# Patient Record
Sex: MALE | Employment: UNEMPLOYED | URBAN - METROPOLITAN AREA
[De-identification: names, ages, dates, MRNs, and addresses within clinical notes are randomized per-mention and may not be internally consistent; named-entity substitution may affect disease eponyms.]

---

## 2024-05-12 ENCOUNTER — HOSPITAL ENCOUNTER (INPATIENT)
Age: 59
LOS: 4 days | Discharge: OTHER INSTITUTION | DRG: 372 | End: 2024-05-16
Attending: STUDENT IN AN ORGANIZED HEALTH CARE EDUCATION/TRAINING PROGRAM | Admitting: INTERNAL MEDICINE
Payer: COMMERCIAL

## 2024-05-12 ENCOUNTER — APPOINTMENT (OUTPATIENT)
Dept: CT IMAGING | Age: 59
DRG: 372 | End: 2024-05-12

## 2024-05-12 ENCOUNTER — HOSPITAL ENCOUNTER (EMERGENCY)
Age: 59
Discharge: HOME | End: 2024-05-12
Attending: EMERGENCY MEDICINE

## 2024-05-12 VITALS
HEIGHT: 72 IN | BODY MASS INDEX: 27.09 KG/M2 | DIASTOLIC BLOOD PRESSURE: 94 MMHG | HEART RATE: 85 BPM | RESPIRATION RATE: 18 BRPM | SYSTOLIC BLOOD PRESSURE: 123 MMHG | WEIGHT: 200 LBS | TEMPERATURE: 98.5 F | OXYGEN SATURATION: 97 %

## 2024-05-12 DIAGNOSIS — K52.9 COLITIS: Primary | ICD-10-CM

## 2024-05-12 LAB
ALBUMIN SERPL-MCNC: 3.5 GM/DL (ref 3.4–5)
ALP BLD-CCNC: 71 IU/L (ref 40–128)
ALT SERPL-CCNC: 12 U/L (ref 10–40)
ANION GAP SERPL CALCULATED.3IONS-SCNC: 11 MMOL/L (ref 7–16)
AST SERPL-CCNC: 13 IU/L (ref 15–37)
BASOPHILS ABSOLUTE: 0 K/CU MM
BASOPHILS RELATIVE PERCENT: 0.3 % (ref 0–1)
BILIRUB SERPL-MCNC: 0.6 MG/DL (ref 0–1)
BUN SERPL-MCNC: 7 MG/DL (ref 6–23)
CALCIUM SERPL-MCNC: 8.2 MG/DL (ref 8.3–10.6)
CHLORIDE BLD-SCNC: 101 MMOL/L (ref 99–110)
CO2: 22 MMOL/L (ref 21–32)
CREAT SERPL-MCNC: 0.6 MG/DL (ref 0.9–1.3)
DIFFERENTIAL TYPE: ABNORMAL
EOSINOPHILS ABSOLUTE: 0.1 K/CU MM
EOSINOPHILS RELATIVE PERCENT: 0.5 % (ref 0–3)
GFR, ESTIMATED: >90 ML/MIN/1.73M2
GLUCOSE SERPL-MCNC: 149 MG/DL (ref 70–99)
HCT VFR BLD CALC: 47.1 % (ref 42–52)
HEMOGLOBIN: 15.8 GM/DL (ref 13.5–18)
IMMATURE NEUTROPHIL %: 0.5 % (ref 0–0.43)
LACTATE: 1.3 MMOL/L (ref 0.5–1.9)
LIPASE: 16 IU/L (ref 13–60)
LYMPHOCYTES ABSOLUTE: 0.8 K/CU MM
LYMPHOCYTES RELATIVE PERCENT: 6.1 % (ref 24–44)
MCH RBC QN AUTO: 32 PG (ref 27–31)
MCHC RBC AUTO-ENTMCNC: 33.5 % (ref 32–36)
MCV RBC AUTO: 95.3 FL (ref 78–100)
MONOCYTES ABSOLUTE: 1.5 K/CU MM
MONOCYTES RELATIVE PERCENT: 10.9 % (ref 0–4)
NEUTROPHILS ABSOLUTE: 10.8 K/CU MM
NEUTROPHILS RELATIVE PERCENT: 81.7 % (ref 36–66)
NUCLEATED RBC %: 0 %
PDW BLD-RTO: 12.8 % (ref 11.7–14.9)
PLATELET # BLD: 196 K/CU MM (ref 140–440)
PMV BLD AUTO: 9.6 FL (ref 7.5–11.1)
POTASSIUM SERPL-SCNC: 3.7 MMOL/L (ref 3.5–5.1)
RBC # BLD: 4.94 M/CU MM (ref 4.6–6.2)
SODIUM BLD-SCNC: 134 MMOL/L (ref 135–145)
TOTAL IMMATURE NEUTOROPHIL: 0.06 K/CU MM
TOTAL NUCLEATED RBC: 0 K/CU MM
TOTAL PROTEIN: 6.4 GM/DL (ref 6.4–8.2)
WBC # BLD: 13.3 K/CU MM (ref 4–10.5)

## 2024-05-12 PROCEDURE — 2580000003 HC RX 258: Performed by: STUDENT IN AN ORGANIZED HEALTH CARE EDUCATION/TRAINING PROGRAM

## 2024-05-12 PROCEDURE — 83605 ASSAY OF LACTIC ACID: CPT

## 2024-05-12 PROCEDURE — 83690 ASSAY OF LIPASE: CPT

## 2024-05-12 PROCEDURE — 85025 COMPLETE CBC W/AUTO DIFF WBC: CPT

## 2024-05-12 PROCEDURE — 6360000002 HC RX W HCPCS: Performed by: STUDENT IN AN ORGANIZED HEALTH CARE EDUCATION/TRAINING PROGRAM

## 2024-05-12 PROCEDURE — 6370000000 HC RX 637 (ALT 250 FOR IP): Performed by: EMERGENCY MEDICINE

## 2024-05-12 PROCEDURE — 6370000000 HC RX 637 (ALT 250 FOR IP): Performed by: STUDENT IN AN ORGANIZED HEALTH CARE EDUCATION/TRAINING PROGRAM

## 2024-05-12 PROCEDURE — 81003 URINALYSIS AUTO W/O SCOPE: CPT

## 2024-05-12 PROCEDURE — 80053 COMPREHEN METABOLIC PANEL: CPT

## 2024-05-12 PROCEDURE — 96375 TX/PRO/DX INJ NEW DRUG ADDON: CPT

## 2024-05-12 PROCEDURE — 6360000002 HC RX W HCPCS: Performed by: EMERGENCY MEDICINE

## 2024-05-12 PROCEDURE — 6360000002 HC RX W HCPCS: Performed by: INTERNAL MEDICINE

## 2024-05-12 PROCEDURE — 1200000000 HC SEMI PRIVATE

## 2024-05-12 PROCEDURE — 99284 EMERGENCY DEPT VISIT MOD MDM: CPT

## 2024-05-12 PROCEDURE — 99285 EMERGENCY DEPT VISIT HI MDM: CPT

## 2024-05-12 PROCEDURE — 74177 CT ABD & PELVIS W/CONTRAST: CPT

## 2024-05-12 PROCEDURE — 87324 CLOSTRIDIUM AG IA: CPT

## 2024-05-12 PROCEDURE — 2500000003 HC RX 250 WO HCPCS: Performed by: STUDENT IN AN ORGANIZED HEALTH CARE EDUCATION/TRAINING PROGRAM

## 2024-05-12 PROCEDURE — 96374 THER/PROPH/DIAG INJ IV PUSH: CPT

## 2024-05-12 PROCEDURE — 87507 IADNA-DNA/RNA PROBE TQ 12-25: CPT

## 2024-05-12 PROCEDURE — 87449 NOS EACH ORGANISM AG IA: CPT

## 2024-05-12 PROCEDURE — 6360000004 HC RX CONTRAST MEDICATION: Performed by: STUDENT IN AN ORGANIZED HEALTH CARE EDUCATION/TRAINING PROGRAM

## 2024-05-12 PROCEDURE — 96372 THER/PROPH/DIAG INJ SC/IM: CPT

## 2024-05-12 PROCEDURE — 6370000000 HC RX 637 (ALT 250 FOR IP): Performed by: INTERNAL MEDICINE

## 2024-05-12 PROCEDURE — 2580000003 HC RX 258: Performed by: INTERNAL MEDICINE

## 2024-05-12 RX ORDER — POTASSIUM CHLORIDE 20 MEQ/1
40 TABLET, EXTENDED RELEASE ORAL PRN
Status: DISCONTINUED | OUTPATIENT
Start: 2024-05-12 | End: 2024-05-16 | Stop reason: HOSPADM

## 2024-05-12 RX ORDER — ESCITALOPRAM OXALATE 10 MG/1
5 TABLET ORAL DAILY
Status: DISCONTINUED | OUTPATIENT
Start: 2024-05-12 | End: 2024-05-16 | Stop reason: HOSPADM

## 2024-05-12 RX ORDER — 0.9 % SODIUM CHLORIDE 0.9 %
1000 INTRAVENOUS SOLUTION INTRAVENOUS ONCE
Status: COMPLETED | OUTPATIENT
Start: 2024-05-12 | End: 2024-05-12

## 2024-05-12 RX ORDER — SODIUM CHLORIDE, SODIUM LACTATE, POTASSIUM CHLORIDE, CALCIUM CHLORIDE 600; 310; 30; 20 MG/100ML; MG/100ML; MG/100ML; MG/100ML
INJECTION, SOLUTION INTRAVENOUS CONTINUOUS
Status: DISPENSED | OUTPATIENT
Start: 2024-05-12 | End: 2024-05-13

## 2024-05-12 RX ORDER — ACETAMINOPHEN 325 MG/1
650 TABLET ORAL EVERY 6 HOURS PRN
Status: DISCONTINUED | OUTPATIENT
Start: 2024-05-12 | End: 2024-05-16 | Stop reason: HOSPADM

## 2024-05-12 RX ORDER — CIPROFLOXACIN 500 MG/1
500 TABLET, FILM COATED ORAL ONCE
Status: COMPLETED | OUTPATIENT
Start: 2024-05-12 | End: 2024-05-12

## 2024-05-12 RX ORDER — SODIUM CHLORIDE 9 MG/ML
INJECTION, SOLUTION INTRAVENOUS PRN
Status: DISCONTINUED | OUTPATIENT
Start: 2024-05-12 | End: 2024-05-16 | Stop reason: HOSPADM

## 2024-05-12 RX ORDER — ACETAMINOPHEN 650 MG/1
650 SUPPOSITORY RECTAL EVERY 6 HOURS PRN
Status: DISCONTINUED | OUTPATIENT
Start: 2024-05-12 | End: 2024-05-16 | Stop reason: HOSPADM

## 2024-05-12 RX ORDER — SODIUM CHLORIDE 0.9 % (FLUSH) 0.9 %
5-40 SYRINGE (ML) INJECTION PRN
Status: DISCONTINUED | OUTPATIENT
Start: 2024-05-12 | End: 2024-05-16 | Stop reason: HOSPADM

## 2024-05-12 RX ORDER — DICYCLOMINE HCL 20 MG
20 TABLET ORAL ONCE
Status: COMPLETED | OUTPATIENT
Start: 2024-05-12 | End: 2024-05-12

## 2024-05-12 RX ORDER — POLYETHYLENE GLYCOL 3350 17 G/17G
17 POWDER, FOR SOLUTION ORAL DAILY PRN
Status: DISCONTINUED | OUTPATIENT
Start: 2024-05-12 | End: 2024-05-16 | Stop reason: HOSPADM

## 2024-05-12 RX ORDER — MAGNESIUM SULFATE IN WATER 40 MG/ML
2000 INJECTION, SOLUTION INTRAVENOUS PRN
Status: DISCONTINUED | OUTPATIENT
Start: 2024-05-12 | End: 2024-05-16 | Stop reason: HOSPADM

## 2024-05-12 RX ORDER — DICYCLOMINE HCL 20 MG
20 TABLET ORAL 4 TIMES DAILY PRN
Status: DISCONTINUED | OUTPATIENT
Start: 2024-05-12 | End: 2024-05-16 | Stop reason: HOSPADM

## 2024-05-12 RX ORDER — FENTANYL CITRATE 50 UG/ML
100 INJECTION, SOLUTION INTRAMUSCULAR; INTRAVENOUS ONCE
Status: COMPLETED | OUTPATIENT
Start: 2024-05-12 | End: 2024-05-12

## 2024-05-12 RX ORDER — POTASSIUM CHLORIDE 7.45 MG/ML
10 INJECTION INTRAVENOUS PRN
Status: DISCONTINUED | OUTPATIENT
Start: 2024-05-12 | End: 2024-05-16 | Stop reason: HOSPADM

## 2024-05-12 RX ORDER — METRONIDAZOLE 500 MG/100ML
500 INJECTION, SOLUTION INTRAVENOUS EVERY 8 HOURS
Status: DISCONTINUED | OUTPATIENT
Start: 2024-05-12 | End: 2024-05-13

## 2024-05-12 RX ORDER — METOCLOPRAMIDE HYDROCHLORIDE 5 MG/ML
10 INJECTION INTRAMUSCULAR; INTRAVENOUS EVERY 6 HOURS
Status: DISCONTINUED | OUTPATIENT
Start: 2024-05-12 | End: 2024-05-16 | Stop reason: HOSPADM

## 2024-05-12 RX ORDER — TAMSULOSIN HYDROCHLORIDE 0.4 MG/1
0.4 CAPSULE ORAL DAILY
Status: DISCONTINUED | OUTPATIENT
Start: 2024-05-12 | End: 2024-05-16 | Stop reason: HOSPADM

## 2024-05-12 RX ORDER — SODIUM CHLORIDE 0.9 % (FLUSH) 0.9 %
5-40 SYRINGE (ML) INJECTION EVERY 12 HOURS SCHEDULED
Status: DISCONTINUED | OUTPATIENT
Start: 2024-05-12 | End: 2024-05-16 | Stop reason: HOSPADM

## 2024-05-12 RX ORDER — METRONIDAZOLE 500 MG/100ML
500 INJECTION, SOLUTION INTRAVENOUS ONCE
Status: COMPLETED | OUTPATIENT
Start: 2024-05-12 | End: 2024-05-12

## 2024-05-12 RX ORDER — MORPHINE SULFATE 2 MG/ML
2 INJECTION, SOLUTION INTRAMUSCULAR; INTRAVENOUS EVERY 4 HOURS PRN
Status: DISCONTINUED | OUTPATIENT
Start: 2024-05-12 | End: 2024-05-14

## 2024-05-12 RX ORDER — DICYCLOMINE HCL 20 MG
20 TABLET ORAL 4 TIMES DAILY PRN
Qty: 20 TABLET | Refills: 0 | Status: SHIPPED | OUTPATIENT
Start: 2024-05-12

## 2024-05-12 RX ORDER — ENOXAPARIN SODIUM 100 MG/ML
40 INJECTION SUBCUTANEOUS DAILY
Status: DISCONTINUED | OUTPATIENT
Start: 2024-05-12 | End: 2024-05-16 | Stop reason: HOSPADM

## 2024-05-12 RX ORDER — ONDANSETRON 4 MG/1
4 TABLET, ORALLY DISINTEGRATING ORAL EVERY 8 HOURS PRN
Status: DISCONTINUED | OUTPATIENT
Start: 2024-05-12 | End: 2024-05-16 | Stop reason: HOSPADM

## 2024-05-12 RX ORDER — KETOROLAC TROMETHAMINE 15 MG/ML
30 INJECTION, SOLUTION INTRAMUSCULAR; INTRAVENOUS ONCE
Status: COMPLETED | OUTPATIENT
Start: 2024-05-12 | End: 2024-05-12

## 2024-05-12 RX ORDER — ONDANSETRON 2 MG/ML
4 INJECTION INTRAMUSCULAR; INTRAVENOUS EVERY 6 HOURS PRN
Status: DISCONTINUED | OUTPATIENT
Start: 2024-05-12 | End: 2024-05-16 | Stop reason: HOSPADM

## 2024-05-12 RX ORDER — MORPHINE SULFATE 4 MG/ML
4 INJECTION, SOLUTION INTRAMUSCULAR; INTRAVENOUS ONCE
Status: COMPLETED | OUTPATIENT
Start: 2024-05-12 | End: 2024-05-12

## 2024-05-12 RX ORDER — FAMOTIDINE 10 MG/ML
20 INJECTION, SOLUTION INTRAVENOUS ONCE
Status: COMPLETED | OUTPATIENT
Start: 2024-05-12 | End: 2024-05-12

## 2024-05-12 RX ORDER — ONDANSETRON 2 MG/ML
4 INJECTION INTRAMUSCULAR; INTRAVENOUS ONCE
Status: COMPLETED | OUTPATIENT
Start: 2024-05-12 | End: 2024-05-12

## 2024-05-12 RX ORDER — KETOROLAC TROMETHAMINE 15 MG/ML
15 INJECTION, SOLUTION INTRAMUSCULAR; INTRAVENOUS ONCE
Status: COMPLETED | OUTPATIENT
Start: 2024-05-12 | End: 2024-05-12

## 2024-05-12 RX ADMIN — SODIUM CHLORIDE, POTASSIUM CHLORIDE, SODIUM LACTATE AND CALCIUM CHLORIDE: 600; 310; 30; 20 INJECTION, SOLUTION INTRAVENOUS at 22:45

## 2024-05-12 RX ADMIN — ENOXAPARIN SODIUM 40 MG: 100 INJECTION SUBCUTANEOUS at 18:09

## 2024-05-12 RX ADMIN — ACETAMINOPHEN 650 MG: 325 TABLET ORAL at 18:15

## 2024-05-12 RX ADMIN — METRONIDAZOLE 500 MG: 500 INJECTION, SOLUTION INTRAVENOUS at 22:57

## 2024-05-12 RX ADMIN — DICYCLOMINE HYDROCHLORIDE 20 MG: 20 TABLET ORAL at 18:09

## 2024-05-12 RX ADMIN — MORPHINE SULFATE 2 MG: 2 INJECTION, SOLUTION INTRAMUSCULAR; INTRAVENOUS at 16:07

## 2024-05-12 RX ADMIN — WATER 1000 MG: 1 INJECTION INTRAMUSCULAR; INTRAVENOUS; SUBCUTANEOUS at 16:11

## 2024-05-12 RX ADMIN — SODIUM CHLORIDE, POTASSIUM CHLORIDE, SODIUM LACTATE AND CALCIUM CHLORIDE 1000 ML: 600; 310; 30; 20 INJECTION, SOLUTION INTRAVENOUS at 16:19

## 2024-05-12 RX ADMIN — IOPAMIDOL 75 ML: 755 INJECTION, SOLUTION INTRAVENOUS at 13:59

## 2024-05-12 RX ADMIN — MORPHINE SULFATE 4 MG: 4 INJECTION, SOLUTION INTRAMUSCULAR; INTRAVENOUS at 14:41

## 2024-05-12 RX ADMIN — METOCLOPRAMIDE 10 MG: 5 INJECTION, SOLUTION INTRAMUSCULAR; INTRAVENOUS at 16:10

## 2024-05-12 RX ADMIN — TAMSULOSIN HYDROCHLORIDE 0.4 MG: 0.4 CAPSULE ORAL at 16:16

## 2024-05-12 RX ADMIN — KETOROLAC TROMETHAMINE 15 MG: 15 INJECTION, SOLUTION INTRAMUSCULAR; INTRAVENOUS at 12:47

## 2024-05-12 RX ADMIN — FENTANYL CITRATE 100 MCG: 50 INJECTION, SOLUTION INTRAMUSCULAR; INTRAVENOUS at 12:50

## 2024-05-12 RX ADMIN — ONDANSETRON 4 MG: 2 INJECTION INTRAMUSCULAR; INTRAVENOUS at 12:46

## 2024-05-12 RX ADMIN — FAMOTIDINE 20 MG: 10 INJECTION, SOLUTION INTRAVENOUS at 12:49

## 2024-05-12 RX ADMIN — METRONIDAZOLE 500 MG: 500 INJECTION, SOLUTION INTRAVENOUS at 14:45

## 2024-05-12 RX ADMIN — METOCLOPRAMIDE 10 MG: 5 INJECTION, SOLUTION INTRAMUSCULAR; INTRAVENOUS at 20:18

## 2024-05-12 RX ADMIN — ESCITALOPRAM OXALATE 5 MG: 10 TABLET ORAL at 16:16

## 2024-05-12 RX ADMIN — CIPROFLOXACIN HYDROCHLORIDE 500 MG: 500 TABLET, FILM COATED ORAL at 14:46

## 2024-05-12 RX ADMIN — SODIUM CHLORIDE 25 ML: 9 INJECTION, SOLUTION INTRAVENOUS at 22:52

## 2024-05-12 RX ADMIN — SODIUM CHLORIDE 1000 ML: 9 INJECTION, SOLUTION INTRAVENOUS at 12:38

## 2024-05-12 RX ADMIN — DICYCLOMINE HYDROCHLORIDE 20 MG: 20 TABLET ORAL at 02:41

## 2024-05-12 RX ADMIN — KETOROLAC TROMETHAMINE 30 MG: 15 INJECTION, SOLUTION INTRAMUSCULAR; INTRAVENOUS at 02:41

## 2024-05-12 RX ADMIN — MORPHINE SULFATE 2 MG: 2 INJECTION, SOLUTION INTRAMUSCULAR; INTRAVENOUS at 20:18

## 2024-05-12 ASSESSMENT — PAIN - FUNCTIONAL ASSESSMENT
PAIN_FUNCTIONAL_ASSESSMENT: PREVENTS OR INTERFERES SOME ACTIVE ACTIVITIES AND ADLS
PAIN_FUNCTIONAL_ASSESSMENT: 0-10
PAIN_FUNCTIONAL_ASSESSMENT: 0-10
PAIN_FUNCTIONAL_ASSESSMENT: ACTIVITIES ARE NOT PREVENTED

## 2024-05-12 ASSESSMENT — PAIN SCALES - GENERAL
PAINLEVEL_OUTOF10: 8
PAINLEVEL_OUTOF10: 5
PAINLEVEL_OUTOF10: 10
PAINLEVEL_OUTOF10: 10
PAINLEVEL_OUTOF10: 8
PAINLEVEL_OUTOF10: 9
PAINLEVEL_OUTOF10: 8

## 2024-05-12 ASSESSMENT — PAIN DESCRIPTION - ORIENTATION
ORIENTATION: RIGHT;LEFT;MID
ORIENTATION: RIGHT;LEFT;MID;LOWER
ORIENTATION: LOWER;RIGHT;LEFT
ORIENTATION: RIGHT;LEFT

## 2024-05-12 ASSESSMENT — PAIN DESCRIPTION - LOCATION
LOCATION: ABDOMEN
LOCATION: ABDOMEN;BACK
LOCATION: ABDOMEN
LOCATION: ABDOMEN;HEAD
LOCATION: ABDOMEN
LOCATION: ABDOMEN

## 2024-05-12 ASSESSMENT — PAIN DESCRIPTION - DESCRIPTORS
DESCRIPTORS: STABBING;SORE
DESCRIPTORS: BURNING;CRAMPING;ACHING
DESCRIPTORS: BURNING
DESCRIPTORS: BURNING;CRAMPING

## 2024-05-12 NOTE — ED NOTES
ED TO INPATIENT SBAR HANDOFF    Patient Name: Tyree Hazel   :  1965  59 y.o.   Preferred Name  Tyree   Family/Caregiver Present no   Restraints no   C-SSRS: Risk of Suicide: No Risk  Sitter no   Sepsis Risk Score Sepsis Risk Score: 1.09      Situation  Chief Complaint   Patient presents with    Abdominal Pain     Has been seen 3 times in the last week for same and diagnosed with colitis. Is a patient at [facility]. States that he took Bentyl without any help      Brief Description of Patient's Condition: Patient is A & O x 4 from [facility]. Patient is being admitted for colitis. Patient has had frequent diarrhea and has been seen x 3 for same complaints. Patient is currently using bedside commode in ED. Resps even and unlabored.   Mental Status: oriented, alert, coherent, logical, thought processes intact, and able to concentrate and follow conversation  Arrived from: detox    Imaging:   CT ABDOMEN PELVIS W IV CONTRAST Additional Contrast? None   Final Result   1. Diffuse colitis which could be related to inflammatory process such as ulcerative colitis. An infectious etiology such as C. difficile is a possibility.   2. Reactive lymphadenopathy leftward retrocrural region   3. Small amount of perihepatic ascites and small right pleural effusion likely reactive in nature   4. Cholelithiasis      Electronically signed by MD Dusty Padron        Abnormal labs:   Abnormal Labs Reviewed   COMPREHENSIVE METABOLIC PANEL - Abnormal; Notable for the following components:       Result Value    Sodium 134 (*)     Glucose 149 (*)     Creatinine 0.6 (*)     Calcium 8.2 (*)     AST 13 (*)     All other components within normal limits   CBC WITH AUTO DIFFERENTIAL - Abnormal; Notable for the following components:    WBC 13.3 (*)     MCH 32.0 (*)     Neutrophils % 81.7 (*)     Lymphocytes % 6.1 (*)     Monocytes % 10.9 (*)     Immature Neutrophil % 0.5 (*)     All other components within normal limits

## 2024-05-12 NOTE — H&P
respiratory distress  GI: Hyperactive bowel sounds, soft, nondistended, diffusely tender, worse in the right lower quadrant with guarding present  MSK: no lower extremity edema  Skin: Intact, dry, warm, no rashes  Neuro: CN II to XII grossly intact  Psych: Normal mood      Past Medical History:   PMHx   Past Medical History:   Diagnosis Date    Alcohol abuse      PSHX:  has no past surgical history on file.  Allergies: No Known Allergies  Fam HX:  family history is not on file.  Soc HX:   Social History     Socioeconomic History    Marital status:      Spouse name: None    Number of children: None    Years of education: None    Highest education level: None   Tobacco Use    Smoking status: Every Day     Types: Cigarettes   Substance and Sexual Activity    Alcohol use: Not Currently     Comment: april 3rd quit drinking    Drug use: Never       Medications:   Medications:    metroNIDAZOLE  500 mg IntraVENous Once    cefTRIAXone (ROCEPHIN) IV  1,000 mg IntraVENous Q24H    metroNIDAZOLE  500 mg IntraVENous Q8H      Infusions:   PRN Meds:      Labs      CBC:   Recent Labs     05/12/24  1230   WBC 13.3*   HGB 15.8        BMP:    Recent Labs     05/12/24  1230   *   K 3.7      CO2 22   BUN 7   CREATININE 0.6*   GLUCOSE 149*     Hepatic:   Recent Labs     05/12/24  1230   AST 13*   ALT 12   BILITOT 0.6   ALKPHOS 71     Lipids: No results found for: \"CHOL\", \"HDL\", \"TRIG\"  Hemoglobin A1C: No results found for: \"LABA1C\"  TSH: No results found for: \"TSH\"  Troponin: No results found for: \"TROPONINT\"  Lactic Acid: No results for input(s): \"LACTA\" in the last 72 hours.  BNP: No results for input(s): \"PROBNP\" in the last 72 hours.  UA:No results found for: \"NITRU\", \"COLORU\", \"PHUR\", \"LABCAST\", \"WBCUA\", \"RBCUA\", \"MUCUS\", \"TRICHOMONAS\", \"YEAST\", \"BACTERIA\", \"CLARITYU\", \"SPECGRAV\", \"LEUKOCYTESUR\", \"UROBILINOGEN\", \"BILIRUBINUR\", \"BLOODU\", \"GLUCOSEU\", \"KETUA\", \"AMORPHOUS\"  Urine Cultures: No results found for:  \"LABURIN\"  Blood Cultures: No results found for: \"BC\"  No results found for: \"BLOODCULT2\"  Organism: No results found for: \"ORG\"    Imaging/Diagnostics Last 24 Hours   CT ABDOMEN PELVIS W IV CONTRAST Additional Contrast? None    Result Date: 5/12/2024  Reason: Elbow pain CT the abdomen pelvis with contrast. TECHNIQUE: Collimated helical images are made from the lower lungs through the pubic symphysis after 75 mL of Isovue-370 intravenous contrast. Up-to-date CT equipment and radiation dose reduction techniques were employed. FINDINGS: Small right-sided pleural effusions identified. Small amount of perihepatic fluid identified. Lymph node identified left retrocrural region measuring 1.6 cm. Diffuse thickening of the colon identified, the wall measures 8 mm with diffuse pericolonic stranding. Within the medial aspect lower pole left kidney is a 11 mm hypodense focus. Within the upper collecting system left kidney evidence of a nonobstructive  3.7 mm calculus. Numerous calcifications identified nondistended gallbladder. The adrenals unremarkable. Pancreas unremarkable. Findings CT the pelvis with contrast: Thickening identified within the rectosigmoid. No free fluid.     1. Diffuse colitis which could be related to inflammatory process such as ulcerative colitis. An infectious etiology such as C. difficile is a possibility. 2. Reactive lymphadenopathy leftward retrocrural region 3. Small amount of perihepatic ascites and small right pleural effusion likely reactive in nature 4. Cholelithiasis Electronically signed by MD Dusty Padron        Electronically signed by Jamee Lambert MD on 5/12/2024 at 2:58 PM

## 2024-05-12 NOTE — ED NOTES
Pt states that he has started the antibiotics that he was rx & has had 3 doses. States he hasn't been able to keep anything down since thurs.

## 2024-05-12 NOTE — PROGRESS NOTES
4 Eyes Skin Assessment     NAME:  Tyree Hazel  YOB: 1965  MEDICAL RECORD NUMBER:  [id number]    The patient is being assessed for  Admission    I agree that at least one RN has performed a thorough Head to Toe Skin Assessment on the patient. ALL assessment sites listed below have been assessed.      Areas assessed by both nurses:    Head, Face, Ears, Shoulders, Back, Chest, Arms, Elbows, Hands, Sacrum. Buttock, Coccyx, Ischium, and Legs. Feet and Heels        Does the Patient have a Wound? No noted wound(s)       Erich Prevention initiated by RN: No  Wound Care Orders initiated by RN: No    Pressure Injury (Stage 3,4, Unstageable, DTI, NWPT, and Complex wounds) if present, place Wound referral order by RN under : No    New Ostomies, if present place, Ostomy referral order under : No     Nurse 1 eSignature: Electronically signed by Muna Elizondo RN on 5/12/24 at 6:33 PM EDT    **SHARE this note so that the co-signing nurse can place an eSignature**    Nurse 2 eSignature: Electronically signed by Vicenta Chauhan RN on 5/12/24 at 6:35 PM EDT

## 2024-05-12 NOTE — ED PROVIDER NOTES
Triage Chief Complaint:   Abdominal Pain and Diarrhea    Lummi:  Tyree Hazel is a 59 y.o. male that presents with lower abdominal cramping and diarrhea that has been going on for a few days.  He was seen last night at another emergency department prescribed Augmentin for pancolitis seen on CT.  States that he continues to have symptoms.  No other new complaints.  Denies vomiting but does have nausea.    ROS:  At least 6 systems reviewed and otherwise acutely negative except as in the Lummi.    Past Medical History:   Diagnosis Date    Alcohol abuse      History reviewed. No pertinent surgical history.  History reviewed. No pertinent family history.  Social History     Socioeconomic History    Marital status:      Spouse name: Not on file    Number of children: Not on file    Years of education: Not on file    Highest education level: Not on file   Occupational History    Not on file   Tobacco Use    Smoking status: Every Day     Types: Cigarettes    Smokeless tobacco: Not on file   Substance and Sexual Activity    Alcohol use: Not Currently     Comment: april 3rd quit drinking    Drug use: Never    Sexual activity: Not on file   Other Topics Concern    Not on file   Social History Narrative    Not on file     Social Determinants of Health     Financial Resource Strain: Not on file   Food Insecurity: Not on file   Transportation Needs: Not on file   Physical Activity: Not on file   Stress: Not on file   Social Connections: Not on file   Intimate Partner Violence: Not on file   Housing Stability: Not on file     No current facility-administered medications for this encounter.     Current Outpatient Medications   Medication Sig Dispense Refill    dicyclomine (BENTYL) 20 MG tablet Take 1 tablet by mouth 4 times daily as needed (abdominal pain) 20 tablet 0     No Known Allergies    Nursing Notes Reviewed    Physical Exam:  ED Triage Vitals   Enc Vitals Group      BP 05/12/24 0236 (!) 123/94      Pulse 05/12/24

## 2024-05-12 NOTE — ED NOTES
Patient transferred to rm. 27 from [facility]. Care assumed at this time.

## 2024-05-12 NOTE — ED TRIAGE NOTES
Patient to the ED with complaints of abdominal pain. Patient is a resident at [facility] and has been seen 3 times for the same thing in the last week. Patient was diagnosed with colitis and reports that prescribed Bentyl is \"not working\". Patient reports 10/10 abdominal pain.

## 2024-05-12 NOTE — ED PROVIDER NOTES
[facility] EMERGENCY DEPARTMENT  Emergency Department Encounter    Pt Name:Tyree Hazel  MRN: [id number]  Birthdate 1965  Date of evaluation: 5/12/24  PCP:  No primary care provider on file.       CHIEF COMPLAINT       Chief Complaint   Patient presents with    Abdominal Pain     Has been seen 3 times in the last week for same and diagnosed with colitis. Is a patient at [facility]. States that he took Bentyl without any help        HISTORY OF PRESENT ILLNESS     Tyree Hazel is a 59 y.o. male who presents with abdominal pain nausea, diarrhea.  Patient has a history of alcohol abuse and is currently at [facility].  He states that he was placed on Augmentin for dental infection a couple days afterwards on Thursday he developed abdominal pain nausea, vomiting.  He reports severe lower abdominal pain associated with nausea, without vomiting and nonbloody diarrhea.    He presented to [facility] yesterday where he underwent a CT scan that showed pancolitis.  White blood cell count at that time was 12.6.  No renal abnormality.    Since being discharged she has had continued pain.  Return to the emergency department last night received Bentyl and Toradol improved and was discharged.  He states he has been taking Bentyl, naproxen, ibuprofen, Pepcid, Pepto-Bismol and Zofran without relief.  He states he has no appetite.  States this morning he had a fever of 100.4F.  Denies previous abdominal surgeries.    PAST MEDICAL / SURGICAL / SOCIAL / FAMILY HISTORY      has a past medical history of Alcohol abuse.     has no past surgical history on file.    Social History     Socioeconomic History    Marital status:      Spouse name: Not on file    Number of children: Not on file    Years of education: Not on file    Highest education level: Not on file   Occupational History    Not on file   Tobacco Use    Smoking status: Every Day     Types: Cigarettes     interpreted by the Emergency Department Physician who either signs or Co-signs this chart in the absence of a cardiologist.      EMERGENCY DEPARTMENT COURSE & MDM:      Medical Decision Making  Patient arrives here for continued abdominal pain nausea, diarrhea.  On examination he appears very uncomfortable, abdomen is soft, nondistended exquisitely tender diffusely but more so in the bilateral lower quadrants.  There is some involuntary guarding.  Vital signs reviewed and within normal limits.  Plan for abdominal labs, repeat CT abdomen pelvis with IV contrast to rule out abscess.  Will treat pain with Pepcid, Toradol, fentanyl.  Administer liter fluids.    Abdominal labs reviewed and unremarkable with exception of mild leukocytosis at 13.3 which is close to what it was the other day at [facility].    On reevaluation patient is feeling better.  Abdomen reexamined and  but overall improved.  He states that when he started taking Augmentin and admitted his abdomen feels queasy but his symptoms here are more concerning for infectious colitis.  Will plan to switch him to Cipro and Flagyl.      CT scan reviewed and shows continued colitis.  Possible ulcerative colitis versus C. difficile.    I reevaluated the patient and his pain has returned.  I considered discharging this patient with pain control however since he is at [facility] unable to give him anything stronger.  Has been compliant to the outpatient management and has failed.  Therefore will plan to admit for pain control, IV fluids, antiemetics, antibiotics and GI evaluation.    Problems Addressed:  Colitis: acute illness or injury    Amount and/or Complexity of Data Reviewed  Labs: ordered.  Radiology: ordered.    Risk  Prescription drug management.  Decision regarding hospitalization.              CONSULTS:  None    CRITICAL CARE:  Total critical care time today provided was at least 0 minutes hat required close evaluation and/or

## 2024-05-12 NOTE — CARE COORDINATION
MCG criteria for inflammatory bowel disease reviewed at this time, criteria supports OBSERVATION.Admission. BAM,RN/CM

## 2024-05-12 NOTE — PROGRESS NOTES
This nurse called and spoke with Florentino and a nurse, both at [facility] to notify them that patient had been admitted.

## 2024-05-13 LAB
ASTROVIRUS PCR: NOT DETECTED
BASOPHILS ABSOLUTE: 0 K/CU MM
BASOPHILS RELATIVE PERCENT: 0.3 % (ref 0–1)
C CAYETANENSIS DNA SPEC QL NAA+PROBE: NOT DETECTED
C DIFF AG + TOXIN: ABNORMAL
CAMPY SP DNA.DIARRHEA STL QL NAA+PROBE: NOT DETECTED
CRYPTOSP DNA SPEC QL NAA+PROBE: NOT DETECTED
DIFFERENTIAL TYPE: ABNORMAL
E COLI DNA SPEC QL NAA+PROBE: NOT DETECTED
E COLI ENTEROAGGREGATIVE PCR: NOT DETECTED
E COLI ENTEROPATHOGENIC PCR: NOT DETECTED
E COLI ENTEROTOXIGENIC PCR: NOT DETECTED
E COLI O157H7 DNA SPEC QL NAA+PROBE: NOT DETECTED
E HISTOLYT DNA SPEC QL NAA+PROBE: NOT DETECTED
EC STX1+STX2 + H7 FLIC SPEC NAA+PROBE: NOT DETECTED
EOSINOPHILS ABSOLUTE: 0 K/CU MM
EOSINOPHILS RELATIVE PERCENT: 0.2 % (ref 0–3)
G LAMBLIA DNA SPEC QL NAA+PROBE: NOT DETECTED
HADV DNA SPEC QL NAA+PROBE: NOT DETECTED
HCT VFR BLD CALC: 41 % (ref 42–52)
HEMOGLOBIN: 13.9 GM/DL (ref 13.5–18)
IMMATURE NEUTROPHIL %: 0.4 % (ref 0–0.43)
LYMPHOCYTES ABSOLUTE: 0.6 K/CU MM
LYMPHOCYTES RELATIVE PERCENT: 5.7 % (ref 24–44)
MCH RBC QN AUTO: 32 PG (ref 27–31)
MCHC RBC AUTO-ENTMCNC: 33.9 % (ref 32–36)
MCV RBC AUTO: 94.3 FL (ref 78–100)
MONOCYTES ABSOLUTE: 1.1 K/CU MM
MONOCYTES RELATIVE PERCENT: 10 % (ref 0–4)
NEUTROPHILS ABSOLUTE: 8.8 K/CU MM
NEUTROPHILS RELATIVE PERCENT: 83.4 % (ref 36–66)
NOROVIRUS RNA SPEC QL NAA+PROBE: NOT DETECTED
NUCLEATED RBC %: 0 %
P SHIGELLOIDES DNA STL QL NAA+PROBE: NOT DETECTED
PDW BLD-RTO: 12.7 % (ref 11.7–14.9)
PHOSPHORUS: 2.9 MG/DL (ref 2.5–4.9)
PLATELET # BLD: 153 K/CU MM (ref 140–440)
PMV BLD AUTO: 9.9 FL (ref 7.5–11.1)
RBC # BLD: 4.35 M/CU MM (ref 4.6–6.2)
RV RNA SPEC QL NAA+PROBE: NOT DETECTED
SALMONELLA DNA SPEC QL NAA+PROBE: NOT DETECTED
SAPOVIRUS PCR: NOT DETECTED
SOURCE: ABNORMAL
TOTAL IMMATURE NEUTOROPHIL: 0.04 K/CU MM
TOTAL NUCLEATED RBC: 0 K/CU MM
V CHOLERAE DNA SPEC QL NAA+PROBE: NOT DETECTED
VIBRIO DNA SPEC NAA+PROBE: ABNORMAL
WBC # BLD: 10.5 K/CU MM (ref 4–10.5)
YERSINIA DNA SPEC NAA+PROBE: NOT DETECTED

## 2024-05-13 PROCEDURE — APPNB60 APP NON BILLABLE TIME 46-60 MINS: Performed by: LICENSED PRACTICAL NURSE

## 2024-05-13 PROCEDURE — 94761 N-INVAS EAR/PLS OXIMETRY MLT: CPT

## 2024-05-13 PROCEDURE — 83993 ASSAY FOR CALPROTECTIN FECAL: CPT

## 2024-05-13 PROCEDURE — 1200000000 HC SEMI PRIVATE

## 2024-05-13 PROCEDURE — 99254 IP/OBS CNSLTJ NEW/EST MOD 60: CPT | Performed by: INTERNAL MEDICINE

## 2024-05-13 PROCEDURE — 85025 COMPLETE CBC W/AUTO DIFF WBC: CPT

## 2024-05-13 PROCEDURE — 6370000000 HC RX 637 (ALT 250 FOR IP): Performed by: FAMILY MEDICINE

## 2024-05-13 PROCEDURE — 6360000002 HC RX W HCPCS: Performed by: STUDENT IN AN ORGANIZED HEALTH CARE EDUCATION/TRAINING PROGRAM

## 2024-05-13 PROCEDURE — 36415 COLL VENOUS BLD VENIPUNCTURE: CPT

## 2024-05-13 PROCEDURE — 2580000003 HC RX 258: Performed by: STUDENT IN AN ORGANIZED HEALTH CARE EDUCATION/TRAINING PROGRAM

## 2024-05-13 PROCEDURE — 6370000000 HC RX 637 (ALT 250 FOR IP): Performed by: INTERNAL MEDICINE

## 2024-05-13 PROCEDURE — 84100 ASSAY OF PHOSPHORUS: CPT

## 2024-05-13 PROCEDURE — C9113 INJ PANTOPRAZOLE SODIUM, VIA: HCPCS | Performed by: STUDENT IN AN ORGANIZED HEALTH CARE EDUCATION/TRAINING PROGRAM

## 2024-05-13 PROCEDURE — 6370000000 HC RX 637 (ALT 250 FOR IP): Performed by: STUDENT IN AN ORGANIZED HEALTH CARE EDUCATION/TRAINING PROGRAM

## 2024-05-13 PROCEDURE — 6360000002 HC RX W HCPCS: Performed by: INTERNAL MEDICINE

## 2024-05-13 PROCEDURE — 2580000003 HC RX 258: Performed by: INTERNAL MEDICINE

## 2024-05-13 RX ORDER — PANTOPRAZOLE SODIUM 40 MG/1
40 TABLET, DELAYED RELEASE ORAL DAILY
COMMUNITY

## 2024-05-13 RX ORDER — CLONIDINE HYDROCHLORIDE 0.1 MG/1
0.1 TABLET ORAL 2 TIMES DAILY
Status: ON HOLD | COMMUNITY
End: 2024-05-16 | Stop reason: HOSPADM

## 2024-05-13 RX ORDER — ESCITALOPRAM OXALATE 10 MG/1
10 TABLET ORAL DAILY
COMMUNITY

## 2024-05-13 RX ORDER — THIAMINE MONONITRATE (VIT B1) 100 MG
100 TABLET ORAL DAILY
COMMUNITY

## 2024-05-13 RX ORDER — SODIUM CHLORIDE, SODIUM LACTATE, POTASSIUM CHLORIDE, CALCIUM CHLORIDE 600; 310; 30; 20 MG/100ML; MG/100ML; MG/100ML; MG/100ML
INJECTION, SOLUTION INTRAVENOUS CONTINUOUS
Status: DISPENSED | OUTPATIENT
Start: 2024-05-13 | End: 2024-05-13

## 2024-05-13 RX ORDER — TAMSULOSIN HYDROCHLORIDE 0.4 MG/1
0.4 CAPSULE ORAL NIGHTLY
COMMUNITY

## 2024-05-13 RX ORDER — HYDROXYZINE PAMOATE 25 MG/1
50 CAPSULE ORAL NIGHTLY PRN
Status: DISCONTINUED | OUTPATIENT
Start: 2024-05-13 | End: 2024-05-14

## 2024-05-13 RX ORDER — HYDROXYZINE PAMOATE 50 MG/1
50 CAPSULE ORAL 3 TIMES DAILY PRN
COMMUNITY

## 2024-05-13 RX ORDER — MELOXICAM 15 MG/1
15 TABLET ORAL DAILY
COMMUNITY

## 2024-05-13 RX ORDER — PANTOPRAZOLE SODIUM 40 MG/10ML
40 INJECTION, POWDER, LYOPHILIZED, FOR SOLUTION INTRAVENOUS DAILY
Status: DISCONTINUED | OUTPATIENT
Start: 2024-05-13 | End: 2024-05-16 | Stop reason: HOSPADM

## 2024-05-13 RX ADMIN — METOCLOPRAMIDE 10 MG: 5 INJECTION, SOLUTION INTRAMUSCULAR; INTRAVENOUS at 21:03

## 2024-05-13 RX ADMIN — METRONIDAZOLE 500 MG: 500 INJECTION, SOLUTION INTRAVENOUS at 06:29

## 2024-05-13 RX ADMIN — ENOXAPARIN SODIUM 40 MG: 100 INJECTION SUBCUTANEOUS at 09:41

## 2024-05-13 RX ADMIN — FIDAXOMICIN 200 MG: 200 TABLET, FILM COATED ORAL at 12:52

## 2024-05-13 RX ADMIN — SODIUM CHLORIDE, PRESERVATIVE FREE 10 ML: 5 INJECTION INTRAVENOUS at 11:24

## 2024-05-13 RX ADMIN — PANTOPRAZOLE SODIUM 40 MG: 40 INJECTION, POWDER, FOR SOLUTION INTRAVENOUS at 12:52

## 2024-05-13 RX ADMIN — SODIUM CHLORIDE, POTASSIUM CHLORIDE, SODIUM LACTATE AND CALCIUM CHLORIDE: 600; 310; 30; 20 INJECTION, SOLUTION INTRAVENOUS at 15:12

## 2024-05-13 RX ADMIN — METOCLOPRAMIDE 10 MG: 5 INJECTION, SOLUTION INTRAMUSCULAR; INTRAVENOUS at 15:07

## 2024-05-13 RX ADMIN — FIDAXOMICIN 200 MG: 200 TABLET, FILM COATED ORAL at 21:04

## 2024-05-13 RX ADMIN — MORPHINE SULFATE 2 MG: 2 INJECTION, SOLUTION INTRAMUSCULAR; INTRAVENOUS at 21:03

## 2024-05-13 RX ADMIN — MORPHINE SULFATE 2 MG: 2 INJECTION, SOLUTION INTRAMUSCULAR; INTRAVENOUS at 00:31

## 2024-05-13 RX ADMIN — MORPHINE SULFATE 2 MG: 2 INJECTION, SOLUTION INTRAMUSCULAR; INTRAVENOUS at 15:07

## 2024-05-13 RX ADMIN — HYDROXYZINE PAMOATE 50 MG: 25 CAPSULE ORAL at 21:59

## 2024-05-13 RX ADMIN — TAMSULOSIN HYDROCHLORIDE 0.4 MG: 0.4 CAPSULE ORAL at 09:42

## 2024-05-13 RX ADMIN — SODIUM CHLORIDE 25 ML: 9 INJECTION, SOLUTION INTRAVENOUS at 06:28

## 2024-05-13 RX ADMIN — MORPHINE SULFATE 2 MG: 2 INJECTION, SOLUTION INTRAMUSCULAR; INTRAVENOUS at 05:29

## 2024-05-13 RX ADMIN — METOCLOPRAMIDE 10 MG: 5 INJECTION, SOLUTION INTRAMUSCULAR; INTRAVENOUS at 03:44

## 2024-05-13 RX ADMIN — ESCITALOPRAM OXALATE 5 MG: 10 TABLET ORAL at 09:42

## 2024-05-13 RX ADMIN — ONDANSETRON 4 MG: 2 INJECTION INTRAMUSCULAR; INTRAVENOUS at 00:31

## 2024-05-13 RX ADMIN — SODIUM CHLORIDE, POTASSIUM CHLORIDE, SODIUM LACTATE AND CALCIUM CHLORIDE: 600; 310; 30; 20 INJECTION, SOLUTION INTRAVENOUS at 09:50

## 2024-05-13 RX ADMIN — METOCLOPRAMIDE 10 MG: 5 INJECTION, SOLUTION INTRAMUSCULAR; INTRAVENOUS at 09:43

## 2024-05-13 RX ADMIN — MORPHINE SULFATE 2 MG: 2 INJECTION, SOLUTION INTRAMUSCULAR; INTRAVENOUS at 09:41

## 2024-05-13 ASSESSMENT — PAIN SCALES - GENERAL
PAINLEVEL_OUTOF10: 6
PAINLEVEL_OUTOF10: 0
PAINLEVEL_OUTOF10: 3
PAINLEVEL_OUTOF10: 0
PAINLEVEL_OUTOF10: 0
PAINLEVEL_OUTOF10: 5
PAINLEVEL_OUTOF10: 5
PAINLEVEL_OUTOF10: 7
PAINLEVEL_OUTOF10: 8
PAINLEVEL_OUTOF10: 8
PAINLEVEL_OUTOF10: 6
PAINLEVEL_OUTOF10: 8
PAINLEVEL_OUTOF10: 6
PAINLEVEL_OUTOF10: 8
PAINLEVEL_OUTOF10: 8
PAINLEVEL_OUTOF10: 4
PAINLEVEL_OUTOF10: 3

## 2024-05-13 ASSESSMENT — PAIN DESCRIPTION - ORIENTATION
ORIENTATION: RIGHT;LEFT
ORIENTATION: ANTERIOR;MID
ORIENTATION: RIGHT;ANTERIOR
ORIENTATION: LOWER
ORIENTATION: ANTERIOR;MID

## 2024-05-13 ASSESSMENT — PAIN DESCRIPTION - PAIN TYPE: TYPE: ACUTE PAIN

## 2024-05-13 ASSESSMENT — PAIN SCALES - WONG BAKER
WONGBAKER_NUMERICALRESPONSE: HURTS LITTLE MORE
WONGBAKER_NUMERICALRESPONSE: NO HURT
WONGBAKER_NUMERICALRESPONSE: HURTS LITTLE MORE
WONGBAKER_NUMERICALRESPONSE: NO HURT
WONGBAKER_NUMERICALRESPONSE: HURTS EVEN MORE
WONGBAKER_NUMERICALRESPONSE: NO HURT
WONGBAKER_NUMERICALRESPONSE: HURTS EVEN MORE
WONGBAKER_NUMERICALRESPONSE: HURTS LITTLE MORE

## 2024-05-13 ASSESSMENT — PAIN - FUNCTIONAL ASSESSMENT
PAIN_FUNCTIONAL_ASSESSMENT: ACTIVITIES ARE NOT PREVENTED
PAIN_FUNCTIONAL_ASSESSMENT: ACTIVITIES ARE NOT PREVENTED
PAIN_FUNCTIONAL_ASSESSMENT: PREVENTS OR INTERFERES SOME ACTIVE ACTIVITIES AND ADLS
PAIN_FUNCTIONAL_ASSESSMENT: ACTIVITIES ARE NOT PREVENTED

## 2024-05-13 ASSESSMENT — PAIN DESCRIPTION - DESCRIPTORS
DESCRIPTORS: ACHING;DISCOMFORT
DESCRIPTORS: ACHING;DISCOMFORT
DESCRIPTORS: SHARP;ACHING
DESCRIPTORS: ACHING;DISCOMFORT
DESCRIPTORS: ACHING

## 2024-05-13 ASSESSMENT — PAIN DESCRIPTION - LOCATION
LOCATION: ABDOMEN
LOCATION: ABDOMEN;HEAD

## 2024-05-13 NOTE — PROGRESS NOTES
V2.0    [facility] Progress Note      Name:  Tyree Hazel /Age/Sex: 1965  (59 y.o. male)   MRN & CSN:  [id number] & [id number] Encounter Date/Time: 2024 9:31 AM EDT   Location:  [address] [place] [address]-A PCP: No primary care provider on file.     Attending:Yosef Bruk MD       Hospital Day: 2    Assessment and Recommendations   Tyree Hazel is a 59 y.o. male with pmh of PTSD, SHI, BPH  who presents with Colitis      Plan:     # Colitis  -Patient with 3 days of right lower quadrant abdominal pain associated with nonbloody diarrhea and nausea as well as fever of 102 this morning  -CT abdomen pelvis showing diffuse colitis which could be related to inflammatory process such as CBC or infectious process such as C. Difficile  -History of bowel issues and no family history  -Received ciprofloxacin and metronidazole in the ED as well as pain medication, Zofran and IV fluids  Morphine for pain  IV fluids  Clear liquid diet  Ceftriaxone and metronidazole ordered  Gastrointestinal pathogen panel and C. difficile toxin ordered  GI consulted  Reglan scheduled     # Former alcohol use disorder  Quit drinking early April     # Tobacco use disorder  -Half pack per day smoker     # PTSD  # SHI  Continue escitalopram, hydroxyzine     # BPH  Continue tamsulosin    Diet ADULT DIET; Clear Liquid   DVT Prophylaxis [x] Lovenox, []  Heparin, [] SCDs, [] Ambulation,  [] Eliquis, [] Xarelto  [] Coumadin   Code Status Full Code   Disposition From: Home  Expected Disposition: Home  Estimated Date of Discharge:   Patient requires continued admission due to    Surrogate Decision Maker/ POA  Self/Wife     Personally reviewed Lab Studies and Imaging     Subjective:     Chief Complaint: abdomen pain & diarrhea    Tyree Hazel is a 59 y.o. male who presents with colitis      Review of Systems:      Pertinent positives and negatives discussed in HPI    Objective:     Intake/Output Summary (Last 24 hours) at 2024 0931  Last data filed at

## 2024-05-13 NOTE — DISCHARGE INSTRUCTIONS
Please continue to take medications as prescribed, ensure that you finish your course of antibiotics  Please set up care with a primary care physician, you were started on a medication that controls BP and HR  Have your PCP once you are set up with them, determine if you need to remain on this medication  If symptoms worsen, return to the nearest ED    1. Call to schedule follow up hospital follow up appointment:   [facility]  [address]  [address]  [place] [address]  Tel: [phone]    [facility]  [address]  [place], [place] [address]  [phone]     [facility]   [address], [place]   [phone]     2. Establish care with a primary care provider  Physician Finder [phone]     [facility]  [address], [address], [place] [address]  [phone]  ISSA Becker,CNP    [facility]  [address] [place] [address]  [phone]  MD Vivian Carmona MD Deanna N. Smith, APRN, CNP  DO Fernando Borden APRN, CNP     [facility]  [address], [address], [place] [address]  [phone]  DO Javy James PA-C Michael S. McKee, MD Marin Kitchens, PA-C    [place] [address] [place] [address]  [phone]  Kirti Tello MD    [facility]  [address] [place] [address] [phone]  MD Pacheco Aguilar MD    [facility]  [address] [place] [address]  [phone]  Katie Toribio, APRN, CNP  Cornelia Medrano APRN, CNP   MD Ying Ramos, PABRI Estevezz Ahmed, MD    [facility] (*Active Waiting

## 2024-05-13 NOTE — PLAN OF CARE
Problem: Discharge Planning  Goal: Discharge to home or other facility with appropriate resources  5/13/2024 0455 by Toya Peters LPN  Outcome: Progressing  5/12/2024 1925 by Muna Elizondo, RN  Outcome: Progressing     Problem: Pain  Goal: Verbalizes/displays adequate comfort level or baseline comfort level  5/13/2024 0455 by Toya Peters LPN  Outcome: Progressing  5/12/2024 1925 by Muna Elizondo, RN  Outcome: Progressing     Problem: Skin/Tissue Integrity  Goal: Absence of new skin breakdown  Description: 1.  Monitor for areas of redness and/or skin breakdown  2.  Assess vascular access sites hourly  3.  Every 4-6 hours minimum:  Change oxygen saturation probe site  4.  Every 4-6 hours:  If on nasal continuous positive airway pressure, respiratory therapy assess nares and determine need for appliance change or resting period.  Outcome: Progressing

## 2024-05-13 NOTE — CARE COORDINATION
CM reviewed pt medical chart and discussed in IDR. CM introduced self and the role of case management. CM in to see pt to initiate discharge planning. Pt is cooperative with assessment. PTA pt independent with ADLs. Pt stated he wanted to return to ???? [facility] and finish that program. Cm noted that pt does not have a listed PCP, so a list was attached to his discharge instructions. CM called Fl[facility] and verified that pt was able to return at discharge.     Plan at discharge is to return to [facility], but return must occur within a 5-day period from admitting date. ???? [facility] will provide transportation.     Discharging Nurse: please call Fl[facility]/ [facility] when DC order is obtained at   [phone].        05/13/24 4760   Service Assessment   Patient Orientation Alert and Oriented   Cognition Alert   History Provided By Patient;Medical Record   Primary Caregiver Self   Support Systems Spouse/Significant Other   Patient's Healthcare Decision Maker is: Legal Next of Kin   PCP Verified by CM No  (PCP list included on DC instructions)   Prior Functional Level Independent in ADLs/IADLs   Current Functional Level Assistance with the following:;Mobility   Can patient return to prior living arrangement Yes   Ability to make needs known: Good   Family able to assist with home care needs: Other (comment)  ([facility])   Would you like for me to discuss the discharge plan with any other family members/significant others, and if so, who? Yes  (spouse)   Financial Resources Medicaid   Community Resources [employer]

## 2024-05-13 NOTE — CONSULTS
on file    Years of education: Not on file    Highest education level: Not on file   Occupational History    Not on file   Tobacco Use    Smoking status: Every Day     Types: Cigarettes    Smokeless tobacco: Not on file   Substance and Sexual Activity    Alcohol use: Not Currently     Comment: april 3rd quit drinking    Drug use: Never    Sexual activity: Not on file   Other Topics Concern    Not on file   Social History Narrative    Not on file     Social Determinants of Health     Financial Resource Strain: Not on file   Food Insecurity: Not on file   Transportation Needs: Not on file   Physical Activity: Not on file   Stress: Not on file   Social Connections: Not on file   Intimate Partner Violence: Not on file   Housing Stability: Not on file       Family History:   History reviewed. No pertinent family history.    PHYSICAL EXAM:    Vitals:  /78   Pulse 97   Temp (!) 96.5 °F (35.8 °C) (Oral)   Resp 16   SpO2 94%   CONSTITUTIONAL: alert, cooperative, no apparent distress,   EYES:  Lids and lashes normal, pupils equal, round and reactive to light, extra ocular muscles intact, sclera clear, conjunctiva normal  ENT:  Normocephalic, without obvious abnormality, atraumatic, sinuses nontender on palpation, external ears without lesions, oral pharynx with moist mucus membranes, tonsils without erythema or exudates, gums normal and good dentition.  NECK:  Supple, symmetrical, trachea midline, no adenopathy, thyroid symmetric, not enlarged and no tenderness, skin normal  HEMATOLOGIC/LYMPHATICS:  no cervical lymphadenopathy and no supraclavicular lymphadenopathy  LUNGS:  No increased work of breathing, good air exchange, clear to auscultation bilaterally, no crackles or wheezing  CARDIOVASCULAR:  Normal apical impulse, regular rate and rhythm, normal S1 and S2, no S3 or S4, and no murmur noted  ABDOMEN:  normal bowel sounds, soft, non-distended, tenderness noted diffusely and in the left lower quadrant, and no  C diff stool test  -on difficile     3) Cholelithiasis with out acute cholecystitis  -no RUQ abdominal pain  -normal LFTs    4) hx of Alcohol use disorder  - stopped drinking early April    5)  Tobacco use disorder  -half a day smoker    6) PTSD/SHI  - on escitalopram, hydroxyzine      Patient's history, exam, and plan of care were discussed with the patient and Dr. Álvarez  . All questions and concerns were discussed with the patient. Patient agreed to plan.      Electronically signed by ISSA Burrell CNP on 5/13/2024 at 11:45 AM

## 2024-05-13 NOTE — PLAN OF CARE
Problem: Discharge Planning  Goal: Discharge to home or other facility with appropriate resources  5/13/2024 1137 by Betzy Reddy RN  Outcome: Progressing  5/13/2024 0455 by Toya Peters LPN  Outcome: Progressing     Problem: Pain  Goal: Verbalizes/displays adequate comfort level or baseline comfort level  5/13/2024 1137 by Betzy Reddy RN  Outcome: Progressing  5/13/2024 0455 by Toya Peters LPN  Outcome: Progressing     Problem: Skin/Tissue Integrity  Goal: Absence of new skin breakdown  Description: 1.  Monitor for areas of redness and/or skin breakdown  2.  Assess vascular access sites hourly  3.  Every 4-6 hours minimum:  Change oxygen saturation probe site  4.  Every 4-6 hours:  If on nasal continuous positive airway pressure, respiratory therapy assess nares and determine need for appliance change or resting period.  5/13/2024 1137 by Betzy Reddy RN  Outcome: Progressing  5/13/2024 0455 by Toya Peters LPN  Outcome: Progressing

## 2024-05-14 LAB
BASOPHILS ABSOLUTE: 0.1 K/CU MM
BASOPHILS RELATIVE PERCENT: 0.4 % (ref 0–1)
DIFFERENTIAL TYPE: ABNORMAL
EKG ATRIAL RATE: 116 BPM
EKG DIAGNOSIS: NORMAL
EKG P AXIS: 29 DEGREES
EKG P-R INTERVAL: 130 MS
EKG Q-T INTERVAL: 314 MS
EKG QRS DURATION: 90 MS
EKG QTC CALCULATION (BAZETT): 436 MS
EKG R AXIS: -76 DEGREES
EKG T AXIS: 42 DEGREES
EKG VENTRICULAR RATE: 116 BPM
EOSINOPHILS ABSOLUTE: 0.1 K/CU MM
EOSINOPHILS RELATIVE PERCENT: 0.5 % (ref 0–3)
HCT VFR BLD CALC: 44.3 % (ref 42–52)
HEMOGLOBIN: 15 GM/DL (ref 13.5–18)
IMMATURE NEUTROPHIL %: 0.4 % (ref 0–0.43)
LYMPHOCYTES ABSOLUTE: 0.6 K/CU MM
LYMPHOCYTES RELATIVE PERCENT: 4.9 % (ref 24–44)
MCH RBC QN AUTO: 31.8 PG (ref 27–31)
MCHC RBC AUTO-ENTMCNC: 33.9 % (ref 32–36)
MCV RBC AUTO: 94.1 FL (ref 78–100)
MONOCYTES ABSOLUTE: 1.1 K/CU MM
MONOCYTES RELATIVE PERCENT: 9.1 % (ref 0–4)
NEUTROPHILS ABSOLUTE: 9.9 K/CU MM
NEUTROPHILS RELATIVE PERCENT: 84.7 % (ref 36–66)
NUCLEATED RBC %: 0 %
PDW BLD-RTO: 12.6 % (ref 11.7–14.9)
PHOSPHORUS: 3.3 MG/DL (ref 2.5–4.9)
PLATELET # BLD: 183 K/CU MM (ref 140–440)
PMV BLD AUTO: 10 FL (ref 7.5–11.1)
RBC # BLD: 4.71 M/CU MM (ref 4.6–6.2)
TOTAL IMMATURE NEUTOROPHIL: 0.05 K/CU MM
TOTAL NUCLEATED RBC: 0 K/CU MM
WBC # BLD: 11.6 K/CU MM (ref 4–10.5)

## 2024-05-14 PROCEDURE — 6370000000 HC RX 637 (ALT 250 FOR IP): Performed by: INTERNAL MEDICINE

## 2024-05-14 PROCEDURE — 6370000000 HC RX 637 (ALT 250 FOR IP): Performed by: STUDENT IN AN ORGANIZED HEALTH CARE EDUCATION/TRAINING PROGRAM

## 2024-05-14 PROCEDURE — 85025 COMPLETE CBC W/AUTO DIFF WBC: CPT

## 2024-05-14 PROCEDURE — 2580000003 HC RX 258: Performed by: INTERNAL MEDICINE

## 2024-05-14 PROCEDURE — 6360000002 HC RX W HCPCS: Performed by: STUDENT IN AN ORGANIZED HEALTH CARE EDUCATION/TRAINING PROGRAM

## 2024-05-14 PROCEDURE — 36415 COLL VENOUS BLD VENIPUNCTURE: CPT

## 2024-05-14 PROCEDURE — 99232 SBSQ HOSP IP/OBS MODERATE 35: CPT | Performed by: INTERNAL MEDICINE

## 2024-05-14 PROCEDURE — 6360000002 HC RX W HCPCS: Performed by: INTERNAL MEDICINE

## 2024-05-14 PROCEDURE — APPNB30 APP NON BILLABLE TIME 0-30 MINS: Performed by: LICENSED PRACTICAL NURSE

## 2024-05-14 PROCEDURE — C9113 INJ PANTOPRAZOLE SODIUM, VIA: HCPCS | Performed by: STUDENT IN AN ORGANIZED HEALTH CARE EDUCATION/TRAINING PROGRAM

## 2024-05-14 PROCEDURE — 1200000000 HC SEMI PRIVATE

## 2024-05-14 PROCEDURE — 84100 ASSAY OF PHOSPHORUS: CPT

## 2024-05-14 PROCEDURE — 93010 ELECTROCARDIOGRAM REPORT: CPT | Performed by: INTERNAL MEDICINE

## 2024-05-14 PROCEDURE — 94761 N-INVAS EAR/PLS OXIMETRY MLT: CPT

## 2024-05-14 PROCEDURE — 93005 ELECTROCARDIOGRAM TRACING: CPT | Performed by: STUDENT IN AN ORGANIZED HEALTH CARE EDUCATION/TRAINING PROGRAM

## 2024-05-14 RX ORDER — MORPHINE SULFATE 2 MG/ML
1 INJECTION, SOLUTION INTRAMUSCULAR; INTRAVENOUS EVERY 8 HOURS PRN
Status: DISCONTINUED | OUTPATIENT
Start: 2024-05-14 | End: 2024-05-15

## 2024-05-14 RX ORDER — HYDROXYZINE PAMOATE 25 MG/1
25 CAPSULE ORAL 3 TIMES DAILY PRN
Status: DISCONTINUED | OUTPATIENT
Start: 2024-05-14 | End: 2024-05-16 | Stop reason: HOSPADM

## 2024-05-14 RX ADMIN — SODIUM CHLORIDE, PRESERVATIVE FREE 10 ML: 5 INJECTION INTRAVENOUS at 10:20

## 2024-05-14 RX ADMIN — FIDAXOMICIN 200 MG: 200 TABLET, FILM COATED ORAL at 22:19

## 2024-05-14 RX ADMIN — ESCITALOPRAM OXALATE 5 MG: 10 TABLET ORAL at 10:18

## 2024-05-14 RX ADMIN — METOCLOPRAMIDE 10 MG: 5 INJECTION, SOLUTION INTRAMUSCULAR; INTRAVENOUS at 04:19

## 2024-05-14 RX ADMIN — HYDROXYZINE PAMOATE 25 MG: 25 CAPSULE ORAL at 16:49

## 2024-05-14 RX ADMIN — ENOXAPARIN SODIUM 40 MG: 100 INJECTION SUBCUTANEOUS at 10:17

## 2024-05-14 RX ADMIN — PANTOPRAZOLE SODIUM 40 MG: 40 INJECTION, POWDER, FOR SOLUTION INTRAVENOUS at 10:19

## 2024-05-14 RX ADMIN — MORPHINE SULFATE 2 MG: 2 INJECTION, SOLUTION INTRAMUSCULAR; INTRAVENOUS at 07:37

## 2024-05-14 RX ADMIN — METOCLOPRAMIDE 10 MG: 5 INJECTION, SOLUTION INTRAMUSCULAR; INTRAVENOUS at 10:18

## 2024-05-14 RX ADMIN — MORPHINE SULFATE 2 MG: 2 INJECTION, SOLUTION INTRAMUSCULAR; INTRAVENOUS at 01:31

## 2024-05-14 RX ADMIN — TAMSULOSIN HYDROCHLORIDE 0.4 MG: 0.4 CAPSULE ORAL at 10:17

## 2024-05-14 RX ADMIN — SODIUM CHLORIDE, PRESERVATIVE FREE 5 ML: 5 INJECTION INTRAVENOUS at 22:45

## 2024-05-14 RX ADMIN — SODIUM CHLORIDE, PRESERVATIVE FREE 10 ML: 5 INJECTION INTRAVENOUS at 01:31

## 2024-05-14 RX ADMIN — MORPHINE SULFATE 1 MG: 2 INJECTION, SOLUTION INTRAMUSCULAR; INTRAVENOUS at 22:16

## 2024-05-14 RX ADMIN — ACETAMINOPHEN 650 MG: 325 TABLET ORAL at 17:12

## 2024-05-14 RX ADMIN — FIDAXOMICIN 200 MG: 200 TABLET, FILM COATED ORAL at 10:28

## 2024-05-14 RX ADMIN — MORPHINE SULFATE 2 MG: 2 INJECTION, SOLUTION INTRAMUSCULAR; INTRAVENOUS at 12:41

## 2024-05-14 RX ADMIN — CIPROFLOXACIN HYDROCHLORIDE 750 MG: 250 TABLET, FILM COATED ORAL at 17:13

## 2024-05-14 ASSESSMENT — PAIN - FUNCTIONAL ASSESSMENT
PAIN_FUNCTIONAL_ASSESSMENT: PREVENTS OR INTERFERES SOME ACTIVE ACTIVITIES AND ADLS
PAIN_FUNCTIONAL_ASSESSMENT: ACTIVITIES ARE NOT PREVENTED
PAIN_FUNCTIONAL_ASSESSMENT: PREVENTS OR INTERFERES SOME ACTIVE ACTIVITIES AND ADLS

## 2024-05-14 ASSESSMENT — PAIN SCALES - GENERAL
PAINLEVEL_OUTOF10: 8
PAINLEVEL_OUTOF10: 0
PAINLEVEL_OUTOF10: 0
PAINLEVEL_OUTOF10: 6
PAINLEVEL_OUTOF10: 8
PAINLEVEL_OUTOF10: 6
PAINLEVEL_OUTOF10: 8
PAINLEVEL_OUTOF10: 8
PAINLEVEL_OUTOF10: 0

## 2024-05-14 ASSESSMENT — PAIN DESCRIPTION - FREQUENCY
FREQUENCY: INTERMITTENT
FREQUENCY: INTERMITTENT

## 2024-05-14 ASSESSMENT — PAIN DESCRIPTION - PAIN TYPE
TYPE: ACUTE PAIN

## 2024-05-14 ASSESSMENT — PAIN DESCRIPTION - DESCRIPTORS
DESCRIPTORS: ACHING

## 2024-05-14 ASSESSMENT — PAIN DESCRIPTION - LOCATION
LOCATION: ABDOMEN
LOCATION: ABDOMEN;HEAD
LOCATION: ABDOMEN

## 2024-05-14 ASSESSMENT — PAIN DESCRIPTION - ORIENTATION
ORIENTATION: RIGHT
ORIENTATION: LOWER
ORIENTATION: RIGHT

## 2024-05-14 ASSESSMENT — PAIN DESCRIPTION - ONSET
ONSET: PROGRESSIVE
ONSET: PROGRESSIVE

## 2024-05-14 NOTE — CARE COORDINATION
Reviewed chart, discussed in IDR, plan to d/c back to [facility] possibly today.  PS to Dr Nice to see if going today so can confirm with [facility].      1515 Dr Nice's answer-Still having abdominal pain sand requiring IV pain medications. Will wean today and plan to dc in the AM   CM will continue to follow.

## 2024-05-14 NOTE — PROGRESS NOTES
V2.0    [facility] Progress Note      Name:  Tyree Hazel /Age/Sex: 1965  (59 y.o. male)   MRN & CSN:  [id number] & [id number] Encounter Date/Time: 2024 9:31 AM EDT   Location:  [address] [place] [address]-A PCP: No primary care provider on file.     Attending:Yosef Burk MD       Hospital Day: 2    Assessment and Recommendations   Tyree Hazel is a 59 y.o. male with pmh of PTSD, SHI, BPH  who presents with Colitis      Plan:   Colitis  Patient with 3 days of right lower quadrant abdominal pain associated with nonbloody diarrhea and nausea as well as fever of 102  --CT abdomen pelvis showing diffuse colitis which could be related to inflammatory process such as CBC or infectious process such as C. Difficile  --Received ciprofloxacin and metronidazole in the ED as well as pain medication, Zofran and IV fluids  -- GI PCR positive for C. difficile and vibrio  --Continue fidaxomicin and ciprofloxacin  --GI on board, appreciate recs, plan for outpatient colonoscopy in 2 to 3 months given that he had colitis on CT back in 2024 as well  --Diet advanced to regular and he is tolerating  --Analgesics and antiemetics as needed  --PPI    Sinus tachycardia  Reported to be having tachycardia to the 150s on exertion per nursing  --Follow-up EKG  --Monitor on telemetry  --Follow-up orthostatics  -- Consider cardiology if unimproved     Former alcohol use disorder  Quit drinking early April  --Plan to return to [facility] was on discharge     Tobacco use disorder  Half pack per day smoker  --Continue to encourage smoking cessation     PTSD  SHI  --Continue escitalopram, hydroxyzine    Hypertension  Patient on home med rec appears to be on clonidine  --Will confirm with and restart as appropriate  --Blood pressure is currently normotensive     BPH  --Continue tamsulosin    Diet ADULT DIET; Clear Liquid   DVT Prophylaxis [x] Lovenox, []  Heparin, [] SCDs, [] Ambulation,  [] Eliquis, [] Xarelto  [] Coumadin   Code Status  Or  ondansetron, 4 mg, Q6H PRN  polyethylene glycol, 17 g, Daily PRN  acetaminophen, 650 mg, Q6H PRN   Or  acetaminophen, 650 mg, Q6H PRN  morphine, 2 mg, Q4H PRN        Labs and Imaging   CT ABDOMEN PELVIS W IV CONTRAST Additional Contrast? None    Result Date: 5/12/2024  Reason: Elbow pain CT the abdomen pelvis with contrast. TECHNIQUE: Collimated helical images are made from the lower lungs through the pubic symphysis after 75 mL of Isovue-370 intravenous contrast. Up-to-date CT equipment and radiation dose reduction techniques were employed. FINDINGS: Small right-sided pleural effusions identified. Small amount of perihepatic fluid identified. Lymph node identified left retrocrural region measuring 1.6 cm. Diffuse thickening of the colon identified, the wall measures 8 mm with diffuse pericolonic stranding. Within the medial aspect lower pole left kidney is a 11 mm hypodense focus. Within the upper collecting system left kidney evidence of a nonobstructive  3.7 mm calculus. Numerous calcifications identified nondistended gallbladder. The adrenals unremarkable. Pancreas unremarkable. Findings CT the pelvis with contrast: Thickening identified within the rectosigmoid. No free fluid.     1. Diffuse colitis which could be related to inflammatory process such as ulcerative colitis. An infectious etiology such as C. difficile is a possibility. 2. Reactive lymphadenopathy leftward retrocrural region 3. Small amount of perihepatic ascites and small right pleural effusion likely reactive in nature 4. Cholelithiasis Electronically signed by MD Dusty Padron      CBC:   Recent Labs     05/12/24  1230   WBC 13.3*   HGB 15.8        BMP:    Recent Labs     05/12/24  1230   *   K 3.7      CO2 22   BUN 7   CREATININE 0.6*   GLUCOSE 149*     Hepatic:   Recent Labs     05/12/24  1230   AST 13*   ALT 12   BILITOT 0.6   ALKPHOS 71     Lipids: No results found for: \"CHOL\", \"HDL\", \"TRIG\"  Hemoglobin A1C: No results

## 2024-05-14 NOTE — PROGRESS NOTES
[facility] Gastroenterology and Hepatology             MD Dai Canela MD Carol Christensen, APRN-CNP       Scarlett Marino, APRN-CNP             [address] [place] [address]             [phone] fax [phone]      Gastroenterology Progress note . 5/14/2024  Reason for consult:   Colitis     Physician attestation:  I have personally seen and examined the patient independently. I have reviewed the patient's available data,including medical history and recent test results. Reviewed and discussed note as documented by JOHN.  I agree with the physical exam findings, assessment and plan.     Briefly   Patient is more comfortable.  Mentions his abdominal pain as well as stool output has decreased.  He feels that his appetite is still not back to where it was yesterday however he did have a few bites of solid food yesterday.    Gen: normal mood, alert  ENT: normal TJ  Cardiovascular: RRR, No M/R/G  Respiratory: CTA BL  Gastrointestinal: Soft,NT ND  Genitourinary: no suprapubic tenderness  Musculoskeletal: no scars  Skin:moist  Neuro: alert and oriented x3    My assessment and plan reveals   #1 colitis  -Likely secondary to infectious colitis with patient being positive for C. difficile as well as vibrio.  -However concerned because he is noted to have colitis even present on his CT scan back in March 2024.  Will recommend outpatient follow-up for outpatient colonoscopy in 2 to 3 months.  -Agree with antibiotics.     #2 C. difficile colitis  -Agree with use of fidaxomicin.     #3 vibrio positive  -Noted to have concern for diverticulitis.  Of note I agree with some antibiotic coverage with him being vibrio positive however would recommend stopping ceftriaxone.  Patient can be continued on ciprofloxacin p.o. for 7 to 10 days.     #4 history of alcohol use disorder  Stopped drinking in early April     #5 cholelithiasis without acute cholecystitis  No right  localizing pulmonary findings.     Cardiovascular: Heart has a regular rate and rhythm, no JVD.   Gastrointestinal: Bowel sounds are present in all four quadrants. Abdomen is soft, diffusely tender; however appears less tender than previous examination, nondistended. Rectal examination deferred.   Skin: No jaundice, spider angiomas, or palmar erythema. No purpura.  Neuro: Awake,alert, and oriented x3. No gross focal neurologic deficits.       Chart and labs reviewed.      IMPRESSION/RECOMMENDATIONS:  1) Colitis likely secondary to infectious colitis/ #2, other differentials also include IBD   -WBC count noted to be normal, initially was 13.3  -pain per primary   -fevers at home, afebrile during admission   -on cipro; continue antibiotics   -CT ket[place] shows pan diverticulitis  -repeat CT scan shows diffuse colitis which could be related to inflammatory process such as ulcerative colitis.  Reactive lymphadenopathy leftward retrocrural region  Small amount of perihepatic ascites and small right pleural effusion likely reactive in nature. Cholelithiasis  -previous CT noed to have colitis back as far as March 2024 will need outpatient follow up with us for a outpatient colonoscopy in 2- 3 months     2) C difficile, noted to have positive C diff stool test  -on difficile      3) Vibrio positive  -Noted to have concern for diverticulitis.  Will need antibiotic coverage.  Recommend continued ciprofloxacin p.o. for 7 to 10 days    4)Cholelithiasis with out acute cholecystitis  -no RUQ abdominal pain  -normal LFTs     5) hx of Alcohol use disorder  - stopped drinking early April     6)  Tobacco use disorder  -half a day smoker     7) PTSD/SHI  - on escitalopram, hydroxyzine        Patient's history, exam, and plan of care were discussed with the patient and Dr. Álvarez  . All questions and concerns were discussed with the patient. Patient agreed to plan.        Thank you for allowing us to be involved in the management of

## 2024-05-14 NOTE — PLAN OF CARE
Problem: Discharge Planning  Goal: Discharge to home or other facility with appropriate resources  5/13/2024 2249 by Chente Andrew, RN  Outcome: Progressing     Problem: Pain  Goal: Verbalizes/displays adequate comfort level or baseline comfort level  5/14/2024 1141 by Sully Cardenas, RN  Outcome: Progressing  5/13/2024 2249 by Chente Andrew, RN  Outcome: Progressing     Problem: Skin/Tissue Integrity  Goal: Absence of new skin breakdown  Description: 1.  Monitor for areas of redness and/or skin breakdown  2.  Assess vascular access sites hourly  3.  Every 4-6 hours minimum:  Change oxygen saturation probe site  4.  Every 4-6 hours:  If on nasal continuous positive airway pressure, respiratory therapy assess nares and determine need for appliance change or resting period.  5/13/2024 2249 by Chente Andrew, RN  Outcome: Progressing

## 2024-05-15 LAB
ALBUMIN SERPL-MCNC: 2.8 GM/DL (ref 3.4–5)
ALP BLD-CCNC: 65 IU/L (ref 40–128)
ALT SERPL-CCNC: 6 U/L (ref 10–40)
ANION GAP SERPL CALCULATED.3IONS-SCNC: 10 MMOL/L (ref 7–16)
AST SERPL-CCNC: 8 IU/L (ref 15–37)
BASOPHILS ABSOLUTE: 0 K/CU MM
BASOPHILS RELATIVE PERCENT: 0.4 % (ref 0–1)
BILIRUB SERPL-MCNC: 0.5 MG/DL (ref 0–1)
BUN SERPL-MCNC: 3 MG/DL (ref 6–23)
CALCIUM SERPL-MCNC: 7.7 MG/DL (ref 8.3–10.6)
CALPROTECTIN STL-MCNT: 639 UG/G
CHLORIDE BLD-SCNC: 97 MMOL/L (ref 99–110)
CO2: 27 MMOL/L (ref 21–32)
CREAT SERPL-MCNC: 0.6 MG/DL (ref 0.9–1.3)
DIFFERENTIAL TYPE: ABNORMAL
EOSINOPHILS ABSOLUTE: 0.1 K/CU MM
EOSINOPHILS RELATIVE PERCENT: 0.5 % (ref 0–3)
GFR, ESTIMATED: >90 ML/MIN/1.73M2
GLUCOSE SERPL-MCNC: 199 MG/DL (ref 70–99)
HCT VFR BLD CALC: 44.3 % (ref 42–52)
HEMOGLOBIN: 14.8 GM/DL (ref 13.5–18)
IMMATURE NEUTROPHIL %: 1 % (ref 0–0.43)
LYMPHOCYTES ABSOLUTE: 0.5 K/CU MM
LYMPHOCYTES RELATIVE PERCENT: 5.5 % (ref 24–44)
MAGNESIUM: 1.6 MG/DL (ref 1.8–2.4)
MCH RBC QN AUTO: 31.4 PG (ref 27–31)
MCHC RBC AUTO-ENTMCNC: 33.4 % (ref 32–36)
MCV RBC AUTO: 93.9 FL (ref 78–100)
MONOCYTES ABSOLUTE: 1 K/CU MM
MONOCYTES RELATIVE PERCENT: 10.5 % (ref 0–4)
NEUTROPHILS ABSOLUTE: 8.1 K/CU MM
NEUTROPHILS RELATIVE PERCENT: 82.1 % (ref 36–66)
NUCLEATED RBC %: 0 %
PDW BLD-RTO: 12.7 % (ref 11.7–14.9)
PHOSPHORUS: 3.2 MG/DL (ref 2.5–4.9)
PLATELET # BLD: 208 K/CU MM (ref 140–440)
PMV BLD AUTO: 9.9 FL (ref 7.5–11.1)
POTASSIUM SERPL-SCNC: 3 MMOL/L (ref 3.5–5.1)
RBC # BLD: 4.72 M/CU MM (ref 4.6–6.2)
SODIUM BLD-SCNC: 134 MMOL/L (ref 135–145)
TOTAL IMMATURE NEUTOROPHIL: 0.1 K/CU MM
TOTAL NUCLEATED RBC: 0 K/CU MM
TOTAL PROTEIN: 4.8 GM/DL (ref 6.4–8.2)
WBC # BLD: 9.8 K/CU MM (ref 4–10.5)

## 2024-05-15 PROCEDURE — 6370000000 HC RX 637 (ALT 250 FOR IP): Performed by: FAMILY MEDICINE

## 2024-05-15 PROCEDURE — C9113 INJ PANTOPRAZOLE SODIUM, VIA: HCPCS | Performed by: STUDENT IN AN ORGANIZED HEALTH CARE EDUCATION/TRAINING PROGRAM

## 2024-05-15 PROCEDURE — 84443 ASSAY THYROID STIM HORMONE: CPT

## 2024-05-15 PROCEDURE — 6370000000 HC RX 637 (ALT 250 FOR IP): Performed by: INTERNAL MEDICINE

## 2024-05-15 PROCEDURE — 6370000000 HC RX 637 (ALT 250 FOR IP): Performed by: STUDENT IN AN ORGANIZED HEALTH CARE EDUCATION/TRAINING PROGRAM

## 2024-05-15 PROCEDURE — 6360000002 HC RX W HCPCS: Performed by: INTERNAL MEDICINE

## 2024-05-15 PROCEDURE — 36415 COLL VENOUS BLD VENIPUNCTURE: CPT

## 2024-05-15 PROCEDURE — 80048 BASIC METABOLIC PNL TOTAL CA: CPT

## 2024-05-15 PROCEDURE — 83735 ASSAY OF MAGNESIUM: CPT

## 2024-05-15 PROCEDURE — APPNB30 APP NON BILLABLE TIME 0-30 MINS: Performed by: LICENSED PRACTICAL NURSE

## 2024-05-15 PROCEDURE — 94761 N-INVAS EAR/PLS OXIMETRY MLT: CPT

## 2024-05-15 PROCEDURE — 6360000002 HC RX W HCPCS: Performed by: STUDENT IN AN ORGANIZED HEALTH CARE EDUCATION/TRAINING PROGRAM

## 2024-05-15 PROCEDURE — 99232 SBSQ HOSP IP/OBS MODERATE 35: CPT | Performed by: INTERNAL MEDICINE

## 2024-05-15 PROCEDURE — 2580000003 HC RX 258: Performed by: STUDENT IN AN ORGANIZED HEALTH CARE EDUCATION/TRAINING PROGRAM

## 2024-05-15 PROCEDURE — 80053 COMPREHEN METABOLIC PANEL: CPT

## 2024-05-15 PROCEDURE — 1200000000 HC SEMI PRIVATE

## 2024-05-15 PROCEDURE — 84100 ASSAY OF PHOSPHORUS: CPT

## 2024-05-15 PROCEDURE — 2580000003 HC RX 258: Performed by: INTERNAL MEDICINE

## 2024-05-15 PROCEDURE — 85025 COMPLETE CBC W/AUTO DIFF WBC: CPT

## 2024-05-15 PROCEDURE — 84132 ASSAY OF SERUM POTASSIUM: CPT

## 2024-05-15 RX ORDER — 0.9 % SODIUM CHLORIDE 0.9 %
1000 INTRAVENOUS SOLUTION INTRAVENOUS ONCE
Status: COMPLETED | OUTPATIENT
Start: 2024-05-15 | End: 2024-05-15

## 2024-05-15 RX ORDER — MORPHINE SULFATE 2 MG/ML
1 INJECTION, SOLUTION INTRAMUSCULAR; INTRAVENOUS EVERY 12 HOURS PRN
Status: DISCONTINUED | OUTPATIENT
Start: 2024-05-15 | End: 2024-05-15

## 2024-05-15 RX ORDER — SODIUM CHLORIDE 9 MG/ML
INJECTION, SOLUTION INTRAVENOUS CONTINUOUS
Status: DISCONTINUED | OUTPATIENT
Start: 2024-05-15 | End: 2024-05-16 | Stop reason: HOSPADM

## 2024-05-15 RX ORDER — MAGNESIUM SULFATE 4 G/50ML
4000 INJECTION INTRAVENOUS ONCE
Status: COMPLETED | OUTPATIENT
Start: 2024-05-15 | End: 2024-05-15

## 2024-05-15 RX ORDER — TRAZODONE HYDROCHLORIDE 50 MG/1
50 TABLET ORAL NIGHTLY
Status: DISCONTINUED | OUTPATIENT
Start: 2024-05-15 | End: 2024-05-16 | Stop reason: HOSPADM

## 2024-05-15 RX ORDER — HYDROCODONE BITARTRATE AND ACETAMINOPHEN 5; 325 MG/1; MG/1
1 TABLET ORAL EVERY 6 HOURS PRN
Status: DISCONTINUED | OUTPATIENT
Start: 2024-05-15 | End: 2024-05-15

## 2024-05-15 RX ADMIN — SODIUM CHLORIDE: 9 INJECTION, SOLUTION INTRAVENOUS at 12:39

## 2024-05-15 RX ADMIN — HYDROXYZINE PAMOATE 25 MG: 25 CAPSULE ORAL at 02:59

## 2024-05-15 RX ADMIN — POTASSIUM CHLORIDE 10 MEQ: 10 INJECTION, SOLUTION INTRAVENOUS at 19:32

## 2024-05-15 RX ADMIN — POTASSIUM CHLORIDE 10 MEQ: 10 INJECTION, SOLUTION INTRAVENOUS at 17:09

## 2024-05-15 RX ADMIN — POTASSIUM CHLORIDE 10 MEQ: 10 INJECTION, SOLUTION INTRAVENOUS at 18:24

## 2024-05-15 RX ADMIN — SODIUM CHLORIDE: 9 INJECTION, SOLUTION INTRAVENOUS at 12:41

## 2024-05-15 RX ADMIN — FIDAXOMICIN 200 MG: 200 TABLET, FILM COATED ORAL at 09:25

## 2024-05-15 RX ADMIN — SODIUM CHLORIDE, PRESERVATIVE FREE 10 ML: 5 INJECTION INTRAVENOUS at 09:14

## 2024-05-15 RX ADMIN — CIPROFLOXACIN HYDROCHLORIDE 750 MG: 250 TABLET, FILM COATED ORAL at 09:10

## 2024-05-15 RX ADMIN — SODIUM CHLORIDE, PRESERVATIVE FREE 10 ML: 5 INJECTION INTRAVENOUS at 21:37

## 2024-05-15 RX ADMIN — POTASSIUM CHLORIDE 10 MEQ: 10 INJECTION, SOLUTION INTRAVENOUS at 12:43

## 2024-05-15 RX ADMIN — MAGNESIUM SULFATE HEPTAHYDRATE 4000 MG: 80 INJECTION, SOLUTION INTRAVENOUS at 15:28

## 2024-05-15 RX ADMIN — DICYCLOMINE HYDROCHLORIDE 20 MG: 20 TABLET ORAL at 09:25

## 2024-05-15 RX ADMIN — ESCITALOPRAM OXALATE 5 MG: 10 TABLET ORAL at 09:10

## 2024-05-15 RX ADMIN — PANTOPRAZOLE SODIUM 40 MG: 40 INJECTION, POWDER, FOR SOLUTION INTRAVENOUS at 09:14

## 2024-05-15 RX ADMIN — POTASSIUM CHLORIDE 10 MEQ: 10 INJECTION, SOLUTION INTRAVENOUS at 14:19

## 2024-05-15 RX ADMIN — HYDROXYZINE PAMOATE 25 MG: 25 CAPSULE ORAL at 12:44

## 2024-05-15 RX ADMIN — ENOXAPARIN SODIUM 40 MG: 100 INJECTION SUBCUTANEOUS at 09:15

## 2024-05-15 RX ADMIN — POTASSIUM CHLORIDE 10 MEQ: 10 INJECTION, SOLUTION INTRAVENOUS at 15:24

## 2024-05-15 RX ADMIN — TAMSULOSIN HYDROCHLORIDE 0.4 MG: 0.4 CAPSULE ORAL at 09:10

## 2024-05-15 RX ADMIN — TRAZODONE HYDROCHLORIDE 50 MG: 50 TABLET ORAL at 21:34

## 2024-05-15 RX ADMIN — MORPHINE SULFATE 1 MG: 2 INJECTION, SOLUTION INTRAMUSCULAR; INTRAVENOUS at 06:18

## 2024-05-15 RX ADMIN — HYDROXYZINE PAMOATE 25 MG: 25 CAPSULE ORAL at 18:54

## 2024-05-15 RX ADMIN — DICYCLOMINE HYDROCHLORIDE 20 MG: 20 TABLET ORAL at 01:46

## 2024-05-15 RX ADMIN — SODIUM CHLORIDE 1000 ML: 9 INJECTION, SOLUTION INTRAVENOUS at 09:35

## 2024-05-15 RX ADMIN — FIDAXOMICIN 200 MG: 200 TABLET, FILM COATED ORAL at 21:34

## 2024-05-15 ASSESSMENT — PAIN DESCRIPTION - ORIENTATION: ORIENTATION: RIGHT

## 2024-05-15 ASSESSMENT — PAIN DESCRIPTION - PAIN TYPE: TYPE: ACUTE PAIN

## 2024-05-15 ASSESSMENT — PAIN DESCRIPTION - DESCRIPTORS
DESCRIPTORS: ACHING;CRAMPING
DESCRIPTORS: ACHING
DESCRIPTORS: ACHING

## 2024-05-15 ASSESSMENT — PAIN SCALES - GENERAL
PAINLEVEL_OUTOF10: 7
PAINLEVEL_OUTOF10: 7
PAINLEVEL_OUTOF10: 0
PAINLEVEL_OUTOF10: 5

## 2024-05-15 ASSESSMENT — PAIN - FUNCTIONAL ASSESSMENT: PAIN_FUNCTIONAL_ASSESSMENT: ACTIVITIES ARE NOT PREVENTED

## 2024-05-15 ASSESSMENT — PAIN DESCRIPTION - LOCATION
LOCATION: ABDOMEN

## 2024-05-15 NOTE — PROGRESS NOTES
of perihepatic ascites and small right pleural effusion likely reactive in nature 4. Cholelithiasis Electronically signed by MD Dusty Padron      CBC:   Recent Labs     05/12/24  1230 05/13/24  0919 05/14/24  0846   WBC 13.3* 10.5 11.6*   HGB 15.8 13.9 15.0    153 183     BMP:    Recent Labs     05/12/24  1230   *   K 3.7      CO2 22   BUN 7   CREATININE 0.6*   GLUCOSE 149*     Hepatic:   Recent Labs     05/12/24  1230   AST 13*   ALT 12   BILITOT 0.6   ALKPHOS 71     Lipids: No results found for: \"CHOL\", \"HDL\", \"TRIG\"  Hemoglobin A1C: No results found for: \"LABA1C\"  TSH: No results found for: \"TSH\"  Troponin: No results found for: \"TROPONINT\"  Lactic Acid: No results for input(s): \"LACTA\" in the last 72 hours.  BNP: No results for input(s): \"PROBNP\" in the last 72 hours.  UA:No results found for: \"NITRU\", \"COLORU\", \"PHUR\", \"LABCAST\", \"WBCUA\", \"RBCUA\", \"MUCUS\", \"TRICHOMONAS\", \"YEAST\", \"BACTERIA\", \"CLARITYU\", \"SPECGRAV\", \"LEUKOCYTESUR\", \"UROBILINOGEN\", \"BILIRUBINUR\", \"BLOODU\", \"GLUCOSEU\", \"KETUA\", \"AMORPHOUS\"  Urine Cultures: No results found for: \"LABURIN\"  Blood Cultures: No results found for: \"BC\"  No results found for: \"BLOODCULT2\"  Organism: No results found for: \"ORG\"      Electronically signed by Paula Nice MD on 5/15/2024 at 8:31 AM

## 2024-05-15 NOTE — PROGRESS NOTES
Pt had cigarettes at bedside. Placed them outside of room in [facility]'s locked drawer and placed label on them.

## 2024-05-15 NOTE — PROGRESS NOTES
[facility] Gastroenterology and Hepatology             MD Dai Canela MD Carol Christensen, APRN-CNP       Scarlett Marino, APRN-CNP             [address] [place] [address]             [phone] fax [phone]      Gastroenterology Progress note . 5/15/2024  Reason for consult:  Colitis     Physician attestation:  I have personally seen and examined the patient independently. I have reviewed the patient's available data,including medical history and recent test results. Reviewed and discussed note as documented by JOHN.  I agree with the physical exam findings, assessment and plan.      Briefly   Patient continues to improve his bowel movements are more formed.  Diarrhea is improving.  Mentions that he was going to be discharged however was noted to be tachycardic and is currently being monitored by medicine.     Gen: normal mood, alert  ENT: normal TJ  Cardiovascular: RRR, No M/R/G  Respiratory: CTA BL  Gastrointestinal: Soft,NT ND  Genitourinary: no suprapubic tenderness  Musculoskeletal: no scars  Skin:moist  Neuro: alert and oriented x3     My assessment and plan reveals   #1 colitis  -Likely secondary to infectious colitis with patient being positive for C. difficile as well as vibrio.  -However concerned because he is noted to have colitis even present on his CT scan back in March 2024.  Will recommend outpatient follow-up for outpatient colonoscopy in 2 to 3 months.  -Agree with antibiotics.     #2 C. difficile colitis  -Agree with use of fidaxomicin.     #3 vibrio positive  -Noted to have concern for diverticulitis.  Of note I agree with some antibiotic coverage with him being vibrio positive however would recommend stopping ceftriaxone.  Patient can be continued on ciprofloxacin p.o. for 7 to 10 days.     #4 history of alcohol use disorder  Stopped drinking in early April     #5 cholelithiasis without acute cholecystitis  No right upper  patient at this time. Patient will need to follow up with us as an outpatient.      Scarlett Pichardo, APRN - CNP  Gastroenterology   5/15/2024   8:56 AM       Recent Labs     05/12/24  1230   AST 13*   ALT 12   ALKPHOS 71   BILITOT 0.6

## 2024-05-16 VITALS
SYSTOLIC BLOOD PRESSURE: 117 MMHG | OXYGEN SATURATION: 90 % | DIASTOLIC BLOOD PRESSURE: 82 MMHG | RESPIRATION RATE: 17 BRPM | HEART RATE: 95 BPM | TEMPERATURE: 98.4 F

## 2024-05-16 PROBLEM — K52.9 COLITIS: Status: RESOLVED | Noted: 2024-05-12 | Resolved: 2024-05-16

## 2024-05-16 LAB
ANION GAP SERPL CALCULATED.3IONS-SCNC: 10 MMOL/L (ref 7–16)
BUN SERPL-MCNC: 2 MG/DL (ref 6–23)
CALCIUM SERPL-MCNC: 7.5 MG/DL (ref 8.3–10.6)
CHLORIDE BLD-SCNC: 103 MMOL/L (ref 99–110)
CO2: 26 MMOL/L (ref 21–32)
CREAT SERPL-MCNC: 0.6 MG/DL (ref 0.9–1.3)
GFR, ESTIMATED: >90 ML/MIN/1.73M2
GLUCOSE SERPL-MCNC: 149 MG/DL (ref 70–99)
MAGNESIUM: 2.1 MG/DL (ref 1.8–2.4)
PHOSPHORUS: 2.8 MG/DL (ref 2.5–4.9)
POTASSIUM SERPL-SCNC: 3.7 MMOL/L (ref 3.5–5.1)
POTASSIUM SERPL-SCNC: 3.7 MMOL/L (ref 3.5–5.1)
SODIUM BLD-SCNC: 139 MMOL/L (ref 135–145)
TSH SERPL DL<=0.005 MIU/L-ACNC: 2.93 UIU/ML (ref 0.27–4.2)

## 2024-05-16 PROCEDURE — 6360000002 HC RX W HCPCS: Performed by: INTERNAL MEDICINE

## 2024-05-16 PROCEDURE — C9113 INJ PANTOPRAZOLE SODIUM, VIA: HCPCS | Performed by: STUDENT IN AN ORGANIZED HEALTH CARE EDUCATION/TRAINING PROGRAM

## 2024-05-16 PROCEDURE — 94761 N-INVAS EAR/PLS OXIMETRY MLT: CPT

## 2024-05-16 PROCEDURE — 6360000002 HC RX W HCPCS: Performed by: STUDENT IN AN ORGANIZED HEALTH CARE EDUCATION/TRAINING PROGRAM

## 2024-05-16 PROCEDURE — 6370000000 HC RX 637 (ALT 250 FOR IP): Performed by: STUDENT IN AN ORGANIZED HEALTH CARE EDUCATION/TRAINING PROGRAM

## 2024-05-16 PROCEDURE — 2580000003 HC RX 258: Performed by: INTERNAL MEDICINE

## 2024-05-16 PROCEDURE — 6370000000 HC RX 637 (ALT 250 FOR IP): Performed by: INTERNAL MEDICINE

## 2024-05-16 RX ORDER — CARVEDILOL 6.25 MG/1
6.25 TABLET ORAL 2 TIMES DAILY WITH MEALS
Qty: 60 TABLET | Refills: 0 | Status: SHIPPED | OUTPATIENT
Start: 2024-05-16 | End: 2024-05-16

## 2024-05-16 RX ORDER — CARVEDILOL 6.25 MG/1
6.25 TABLET ORAL 2 TIMES DAILY WITH MEALS
Status: DISCONTINUED | OUTPATIENT
Start: 2024-05-16 | End: 2024-05-16 | Stop reason: HOSPADM

## 2024-05-16 RX ORDER — VANCOMYCIN HYDROCHLORIDE 125 MG/1
125 CAPSULE ORAL 4 TIMES DAILY
Qty: 24 CAPSULE | Refills: 0 | Status: SHIPPED | OUTPATIENT
Start: 2024-05-16 | End: 2024-05-22

## 2024-05-16 RX ORDER — CARVEDILOL 6.25 MG/1
3.12 TABLET ORAL 2 TIMES DAILY WITH MEALS
Qty: 60 TABLET | Refills: 0 | Status: SHIPPED | OUTPATIENT
Start: 2024-05-16

## 2024-05-16 RX ORDER — CIPROFLOXACIN 750 MG/1
750 TABLET, FILM COATED ORAL
Qty: 6 TABLET | Refills: 0 | Status: SHIPPED | OUTPATIENT
Start: 2024-05-17 | End: 2024-05-23

## 2024-05-16 RX ADMIN — TAMSULOSIN HYDROCHLORIDE 0.4 MG: 0.4 CAPSULE ORAL at 09:50

## 2024-05-16 RX ADMIN — CARVEDILOL 6.25 MG: 6.25 TABLET, FILM COATED ORAL at 09:50

## 2024-05-16 RX ADMIN — ESCITALOPRAM OXALATE 5 MG: 10 TABLET ORAL at 09:50

## 2024-05-16 RX ADMIN — HYDROXYZINE PAMOATE 25 MG: 25 CAPSULE ORAL at 11:41

## 2024-05-16 RX ADMIN — FIDAXOMICIN 200 MG: 200 TABLET, FILM COATED ORAL at 09:50

## 2024-05-16 RX ADMIN — SODIUM CHLORIDE, PRESERVATIVE FREE 10 ML: 5 INJECTION INTRAVENOUS at 09:51

## 2024-05-16 RX ADMIN — ENOXAPARIN SODIUM 40 MG: 100 INJECTION SUBCUTANEOUS at 09:51

## 2024-05-16 RX ADMIN — PANTOPRAZOLE SODIUM 40 MG: 40 INJECTION, POWDER, FOR SOLUTION INTRAVENOUS at 09:50

## 2024-05-16 RX ADMIN — CIPROFLOXACIN HYDROCHLORIDE 750 MG: 250 TABLET, FILM COATED ORAL at 09:53

## 2024-05-16 ASSESSMENT — PAIN SCALES - GENERAL: PAINLEVEL_OUTOF10: 0

## 2024-05-16 ASSESSMENT — PAIN SCALES - WONG BAKER: WONGBAKER_NUMERICALRESPONSE: HURTS EVEN MORE

## 2024-05-16 NOTE — PLAN OF CARE
Problem: Discharge Planning  Goal: Discharge to home or other facility with appropriate resources  5/16/2024 1334 by Tere Laura RN  Outcome: Adequate for Discharge  5/16/2024 1125 by Tere Laura RN  Outcome: Progressing  Flowsheets (Taken 5/16/2024 0949)  Discharge to home or other facility with appropriate resources: Identify barriers to discharge with patient and caregiver  5/16/2024 0500 by Courtney Carolina LPN  Outcome: Progressing     Problem: Pain  Goal: Verbalizes/displays adequate comfort level or baseline comfort level  5/16/2024 1334 by Tere Laura RN  Outcome: Adequate for Discharge  5/16/2024 1125 by Tere Laura RN  Outcome: Progressing  Flowsheets (Taken 5/16/2024 0949)  Verbalizes/displays adequate comfort level or baseline comfort level: Encourage patient to monitor pain and request assistance  5/16/2024 0500 by Courtney Carolina LPN  Outcome: Progressing  Flowsheets (Taken 5/16/2024 0315)  Verbalizes/displays adequate comfort level or baseline comfort level:   Encourage patient to monitor pain and request assistance   Assess pain using appropriate pain scale   Administer analgesics based on type and severity of pain and evaluate response   Implement non-pharmacological measures as appropriate and evaluate response   Consider cultural and social influences on pain and pain management   Notify Licensed Independent Practitioner if interventions unsuccessful or patient reports new pain     Problem: Skin/Tissue Integrity  Goal: Absence of new skin breakdown  Description: 1.  Monitor for areas of redness and/or skin breakdown  2.  Assess vascular access sites hourly  3.  Every 4-6 hours minimum:  Change oxygen saturation probe site  4.  Every 4-6 hours:  If on nasal continuous positive airway pressure, respiratory therapy assess nares and determine need for appliance change or resting period.  5/16/2024 1334 by Tere Laura RN  Outcome: Adequate for Discharge  5/16/2024 1125 by Valentín

## 2024-05-16 NOTE — CARE COORDINATION
Contacted by [facility] op pharmacy to assist with insurance I.D. number.  Insurance info located.

## 2024-05-16 NOTE — PROGRESS NOTES
Outpatient Pharmacy Progress Note for Meds-to-Beds    Total number of Prescriptions Filled: 2    Additional Documentation:  Patient picked-up the medication(s) in the OP Pharmacy  [place] paper is pending for Dificid. If not approved, may need to switch to oral vancomycin       Thank you for letting us serve your patients.  [facility] - [address] [address]    Phone: [phone]    Fax: [phone]

## 2024-05-16 NOTE — CARE COORDINATION
Received call from Dr. Iverson that patient was discharged to [facility] without Dificid and that pharmacy needs to complete a prior auth. Per Dr. Iverson, medication can be changed to oral vanco, but she is unsure how patient will receive medication since he has already been discharged. Call to [facility] and notified staff that patient or staff will need to pick patients prescription from the [facility] OP pharmacy.

## 2024-05-16 NOTE — DISCHARGE SUMMARY
V2.0  Discharge Summary    Name:  Tyree Hazel /Age/Sex: 1965 (59 y.o. male)   Admit Date: 2024  Discharge Date: 24    MRN & CSN:  [id number] & [id number] Encounter Date and Time 24 2:28 PM EDT    Attending:  No att. providers found Discharging Provider: Paula Nice MD       Hospital Course:     Brief HPI: Tyree Hazel is a 59 y.o. male  with pmh of alcohol use disorder, PTSD, SHI, BPH  who presents with Colitis     Brief Problem Based Course:   C. Diff Colitis - resolving  Patient with 3 days of right lower quadrant abdominal pain associated with nonbloody diarrhea and nausea as well as fever of 102  --CT abdomen pelvis showing diffuse colitis   --Received ciprofloxacin and metronidazole in the ED as well as pain medication, Zofran and IV fluids  --GI PCR positive for C. difficile and vibrio  --Continue oral vancomycin and ciprofloxacin with end dates  and  respectively, diarrhea improved  --GI on board, appreciate recs, plan for outpatient colonoscopy in 2 to 3 months given that he had colitis on CT back in 2024 as well  --Diet advanced to regular and he is tolerating  --Analgesics and antiemetics as needed  --PPI     Sinus tachycardia - Improving  Tachycardia improved with fluid bolus and small dose of beta blocker. TSH wnl and EKG showed sinus tachycardia  --Was likely 2/2 large volume loss with diarrhea  --Orthostatics negative, he is s/p fluids    Former alcohol use disorder  Quit drinking early April  --Return to [employer] today     Tobacco use disorder  Half pack per day smoker  --Continue to encourage smoking cessation     PTSD  SHI  --Continue escitalopram, hydroxyzine     Hypertension  Patient on home med rec appears to be on clonidine  --Will confirm with and restart as appropriate  --Blood pressure is currently normotensive     BPH  --Continue tamsulosin    Patient is medically stable and cleared for DC    The patient expressed

## 2024-05-16 NOTE — PLAN OF CARE
Problem: Discharge Planning  Goal: Discharge to home or other facility with appropriate resources  5/16/2024 1125 by Tere Laura RN  Outcome: Progressing  Flowsheets (Taken 5/16/2024 0949)  Discharge to home or other facility with appropriate resources: Identify barriers to discharge with patient and caregiver  5/16/2024 0500 by Courtney Carolina LPN  Outcome: Progressing     Problem: Pain  Goal: Verbalizes/displays adequate comfort level or baseline comfort level  5/16/2024 1125 by Tere Laura RN  Outcome: Progressing  Flowsheets (Taken 5/16/2024 0949)  Verbalizes/displays adequate comfort level or baseline comfort level: Encourage patient to monitor pain and request assistance  5/16/2024 0500 by Courtney Carolina LPN  Outcome: Progressing  Flowsheets (Taken 5/16/2024 0315)  Verbalizes/displays adequate comfort level or baseline comfort level:   Encourage patient to monitor pain and request assistance   Assess pain using appropriate pain scale   Administer analgesics based on type and severity of pain and evaluate response   Implement non-pharmacological measures as appropriate and evaluate response   Consider cultural and social influences on pain and pain management   Notify Licensed Independent Practitioner if interventions unsuccessful or patient reports new pain     Problem: Skin/Tissue Integrity  Goal: Absence of new skin breakdown  Description: 1.  Monitor for areas of redness and/or skin breakdown  2.  Assess vascular access sites hourly  3.  Every 4-6 hours minimum:  Change oxygen saturation probe site  4.  Every 4-6 hours:  If on nasal continuous positive airway pressure, respiratory therapy assess nares and determine need for appliance change or resting period.  5/16/2024 1125 by Tere Laura RN  Outcome: Progressing  5/16/2024 0500 by Courtney Carolina LPN  Outcome: Progressing     Problem: Safety - Adult  Goal: Free from fall injury  5/16/2024 1125 by Tere Laura RN  Outcome:

## 2024-05-16 NOTE — CARE COORDINATION
Received referral from [facility].  Issued a voucher on for 2 medications at time of discharge.  Voucher was faxed to [facility].      Patient will need to complete a MedAssist Application and provide required documentation to determine eligibility for future help.      Added patient to flagged list.